# Patient Record
Sex: FEMALE | Race: WHITE | NOT HISPANIC OR LATINO
[De-identification: names, ages, dates, MRNs, and addresses within clinical notes are randomized per-mention and may not be internally consistent; named-entity substitution may affect disease eponyms.]

---

## 2020-10-15 ENCOUNTER — ASOB RESULT (OUTPATIENT)
Age: 35
End: 2020-10-15

## 2020-10-15 ENCOUNTER — TRANSCRIPTION ENCOUNTER (OUTPATIENT)
Age: 35
End: 2020-10-15

## 2020-10-15 ENCOUNTER — APPOINTMENT (OUTPATIENT)
Dept: MATERNAL FETAL MEDICINE | Facility: CLINIC | Age: 35
End: 2020-10-15

## 2020-10-15 RX ORDER — ISOPROPYL ALCOHOL 0.7 ML/ML
SWAB TOPICAL
Qty: 2 | Refills: 2 | Status: ACTIVE | COMMUNITY
Start: 2020-10-15 | End: 1900-01-01

## 2020-10-15 RX ORDER — LANCETS 33 GAUGE
EACH MISCELLANEOUS
Qty: 2 | Refills: 2 | Status: ACTIVE | COMMUNITY
Start: 2020-10-15 | End: 1900-01-01

## 2020-10-15 RX ORDER — BLOOD-GLUCOSE METER
KIT MISCELLANEOUS 4 TIMES DAILY
Qty: 2 | Refills: 2 | Status: ACTIVE | COMMUNITY
Start: 2020-10-15 | End: 1900-01-01

## 2020-10-15 RX ORDER — BLOOD-GLUCOSE METER
W/DEVICE KIT MISCELLANEOUS
Qty: 1 | Refills: 0 | Status: ACTIVE | COMMUNITY
Start: 2020-10-15 | End: 1900-01-01

## 2020-10-19 ENCOUNTER — ASOB RESULT (OUTPATIENT)
Age: 35
End: 2020-10-19

## 2020-10-19 ENCOUNTER — APPOINTMENT (OUTPATIENT)
Dept: ANTEPARTUM | Facility: CLINIC | Age: 35
End: 2020-10-19
Payer: COMMERCIAL

## 2020-10-19 PROCEDURE — 76811 OB US DETAILED SNGL FETUS: CPT

## 2020-10-23 ENCOUNTER — APPOINTMENT (OUTPATIENT)
Dept: MATERNAL FETAL MEDICINE | Facility: CLINIC | Age: 35
End: 2020-10-23
Payer: COMMERCIAL

## 2020-10-23 ENCOUNTER — ASOB RESULT (OUTPATIENT)
Age: 35
End: 2020-10-23

## 2020-10-23 PROCEDURE — G0108 DIAB MANAGE TRN  PER INDIV: CPT | Mod: 95

## 2020-10-23 RX ORDER — PEN NEEDLE, DIABETIC 29 G X1/2"
32G X 4 MM NEEDLE, DISPOSABLE MISCELLANEOUS
Qty: 1 | Refills: 1 | Status: ACTIVE | COMMUNITY
Start: 2020-10-23 | End: 1900-01-01

## 2020-10-23 RX ORDER — ISOPROPYL ALCOHOL 0.7 ML/ML
SWAB TOPICAL
Qty: 1 | Refills: 1 | Status: ACTIVE | COMMUNITY
Start: 2020-10-23 | End: 1900-01-01

## 2020-10-28 ENCOUNTER — OUTPATIENT (OUTPATIENT)
Dept: OUTPATIENT SERVICES | Age: 35
LOS: 1 days | Discharge: HOME | End: 2020-10-28

## 2020-10-30 ENCOUNTER — APPOINTMENT (OUTPATIENT)
Dept: MATERNAL FETAL MEDICINE | Facility: CLINIC | Age: 35
End: 2020-10-30
Payer: COMMERCIAL

## 2020-10-30 ENCOUNTER — ASOB RESULT (OUTPATIENT)
Age: 35
End: 2020-10-30

## 2020-10-30 PROCEDURE — G0108 DIAB MANAGE TRN  PER INDIV: CPT | Mod: 95

## 2020-11-04 ENCOUNTER — APPOINTMENT (OUTPATIENT)
Dept: PEDIATRIC CARDIOLOGY | Facility: CLINIC | Age: 35
End: 2020-11-04
Payer: COMMERCIAL

## 2020-11-04 PROCEDURE — 76825 ECHO EXAM OF FETAL HEART: CPT

## 2020-11-04 PROCEDURE — 99072 ADDL SUPL MATRL&STAF TM PHE: CPT

## 2020-11-04 PROCEDURE — 76827 ECHO EXAM OF FETAL HEART: CPT

## 2020-11-04 PROCEDURE — 93325 DOPPLER ECHO COLOR FLOW MAPG: CPT

## 2020-11-13 ENCOUNTER — ASOB RESULT (OUTPATIENT)
Age: 35
End: 2020-11-13

## 2020-11-13 ENCOUNTER — APPOINTMENT (OUTPATIENT)
Dept: MATERNAL FETAL MEDICINE | Facility: CLINIC | Age: 35
End: 2020-11-13
Payer: COMMERCIAL

## 2020-11-13 PROCEDURE — G0108 DIAB MANAGE TRN  PER INDIV: CPT | Mod: 95

## 2020-11-13 RX ORDER — INSULIN LISPRO 100 [IU]/ML
100 INJECTION, SOLUTION INTRAVENOUS; SUBCUTANEOUS
Qty: 1 | Refills: 1 | Status: ACTIVE | COMMUNITY
Start: 2020-11-13 | End: 1900-01-01

## 2020-11-23 ENCOUNTER — ASOB RESULT (OUTPATIENT)
Age: 35
End: 2020-11-23

## 2020-11-23 ENCOUNTER — APPOINTMENT (OUTPATIENT)
Dept: ANTEPARTUM | Facility: CLINIC | Age: 35
End: 2020-11-23
Payer: COMMERCIAL

## 2020-11-23 PROCEDURE — 99214 OFFICE O/P EST MOD 30 MIN: CPT | Mod: 95

## 2020-12-04 ENCOUNTER — APPOINTMENT (OUTPATIENT)
Dept: MATERNAL FETAL MEDICINE | Facility: CLINIC | Age: 35
End: 2020-12-04
Payer: COMMERCIAL

## 2020-12-04 ENCOUNTER — ASOB RESULT (OUTPATIENT)
Age: 35
End: 2020-12-04

## 2020-12-04 PROCEDURE — G0108 DIAB MANAGE TRN  PER INDIV: CPT | Mod: 95

## 2020-12-15 ENCOUNTER — APPOINTMENT (OUTPATIENT)
Dept: ANTEPARTUM | Facility: CLINIC | Age: 35
End: 2020-12-15
Payer: COMMERCIAL

## 2020-12-15 ENCOUNTER — ASOB RESULT (OUTPATIENT)
Age: 35
End: 2020-12-15

## 2020-12-15 PROCEDURE — 76816 OB US FOLLOW-UP PER FETUS: CPT

## 2020-12-15 PROCEDURE — 99072 ADDL SUPL MATRL&STAF TM PHE: CPT

## 2020-12-15 PROCEDURE — 76819 FETAL BIOPHYS PROFIL W/O NST: CPT

## 2020-12-24 ENCOUNTER — ASOB RESULT (OUTPATIENT)
Age: 35
End: 2020-12-24

## 2020-12-24 ENCOUNTER — APPOINTMENT (OUTPATIENT)
Dept: MATERNAL FETAL MEDICINE | Facility: CLINIC | Age: 35
End: 2020-12-24
Payer: COMMERCIAL

## 2020-12-24 PROCEDURE — G0108 DIAB MANAGE TRN  PER INDIV: CPT | Mod: 95

## 2021-01-12 ENCOUNTER — ASOB RESULT (OUTPATIENT)
Age: 36
End: 2021-01-12

## 2021-01-12 ENCOUNTER — APPOINTMENT (OUTPATIENT)
Dept: ANTEPARTUM | Facility: CLINIC | Age: 36
End: 2021-01-12
Payer: COMMERCIAL

## 2021-01-12 ENCOUNTER — OUTPATIENT (OUTPATIENT)
Dept: OUTPATIENT SERVICES | Facility: HOSPITAL | Age: 36
LOS: 1 days | End: 2021-01-12

## 2021-01-12 ENCOUNTER — APPOINTMENT (OUTPATIENT)
Dept: ANTEPARTUM | Facility: HOSPITAL | Age: 36
End: 2021-01-12

## 2021-01-12 PROCEDURE — 76818 FETAL BIOPHYS PROFILE W/NST: CPT | Mod: 26

## 2021-01-12 PROCEDURE — 76816 OB US FOLLOW-UP PER FETUS: CPT

## 2021-01-12 PROCEDURE — 99072 ADDL SUPL MATRL&STAF TM PHE: CPT

## 2021-01-15 ENCOUNTER — APPOINTMENT (OUTPATIENT)
Dept: MATERNAL FETAL MEDICINE | Facility: CLINIC | Age: 36
End: 2021-01-15
Payer: COMMERCIAL

## 2021-01-15 ENCOUNTER — ASOB RESULT (OUTPATIENT)
Age: 36
End: 2021-01-15

## 2021-01-15 PROCEDURE — G0108 DIAB MANAGE TRN  PER INDIV: CPT | Mod: 95

## 2021-01-26 ENCOUNTER — OUTPATIENT (OUTPATIENT)
Dept: OUTPATIENT SERVICES | Facility: HOSPITAL | Age: 36
LOS: 1 days | End: 2021-01-26

## 2021-01-26 ENCOUNTER — ASOB RESULT (OUTPATIENT)
Age: 36
End: 2021-01-26

## 2021-01-26 ENCOUNTER — APPOINTMENT (OUTPATIENT)
Dept: ANTEPARTUM | Facility: HOSPITAL | Age: 36
End: 2021-01-26

## 2021-01-26 ENCOUNTER — APPOINTMENT (OUTPATIENT)
Dept: ANTEPARTUM | Facility: CLINIC | Age: 36
End: 2021-01-26
Payer: COMMERCIAL

## 2021-01-26 PROCEDURE — 76818 FETAL BIOPHYS PROFILE W/NST: CPT | Mod: 26

## 2021-02-02 ENCOUNTER — APPOINTMENT (OUTPATIENT)
Dept: ANTEPARTUM | Facility: HOSPITAL | Age: 36
End: 2021-02-02

## 2021-02-02 ENCOUNTER — APPOINTMENT (OUTPATIENT)
Dept: ANTEPARTUM | Facility: CLINIC | Age: 36
End: 2021-02-02

## 2021-02-05 ENCOUNTER — APPOINTMENT (OUTPATIENT)
Dept: MATERNAL FETAL MEDICINE | Facility: CLINIC | Age: 36
End: 2021-02-05
Payer: COMMERCIAL

## 2021-02-05 ENCOUNTER — ASOB RESULT (OUTPATIENT)
Age: 36
End: 2021-02-05

## 2021-02-05 DIAGNOSIS — O09.523 SUPERVISION OF ELDERLY MULTIGRAVIDA, THIRD TRIMESTER: ICD-10-CM

## 2021-02-05 DIAGNOSIS — O34.13 MATERNAL CARE FOR BENIGN TUMOR OF CORPUS UTERI, THIRD TRIMESTER: ICD-10-CM

## 2021-02-05 DIAGNOSIS — O24.414 GESTATIONAL DIABETES MELLITUS IN PREGNANCY, INSULIN CONTROLLED: ICD-10-CM

## 2021-02-05 PROCEDURE — G0108 DIAB MANAGE TRN  PER INDIV: CPT | Mod: 95

## 2021-02-05 RX ORDER — INSULIN HUMAN 100 [IU]/ML
100 INJECTION, SUSPENSION SUBCUTANEOUS
Qty: 1 | Refills: 2 | Status: ACTIVE | COMMUNITY
Start: 2020-10-23 | End: 1900-01-01

## 2021-02-09 ENCOUNTER — APPOINTMENT (OUTPATIENT)
Dept: ANTEPARTUM | Facility: CLINIC | Age: 36
End: 2021-02-09

## 2021-02-09 ENCOUNTER — APPOINTMENT (OUTPATIENT)
Dept: ANTEPARTUM | Facility: CLINIC | Age: 36
End: 2021-02-09
Payer: COMMERCIAL

## 2021-02-09 ENCOUNTER — APPOINTMENT (OUTPATIENT)
Dept: ANTEPARTUM | Facility: HOSPITAL | Age: 36
End: 2021-02-09

## 2021-02-09 ENCOUNTER — ASOB RESULT (OUTPATIENT)
Age: 36
End: 2021-02-09

## 2021-02-09 ENCOUNTER — OUTPATIENT (OUTPATIENT)
Dept: OUTPATIENT SERVICES | Facility: HOSPITAL | Age: 36
LOS: 1 days | End: 2021-02-09

## 2021-02-09 PROCEDURE — 99072 ADDL SUPL MATRL&STAF TM PHE: CPT

## 2021-02-09 PROCEDURE — 76816 OB US FOLLOW-UP PER FETUS: CPT

## 2021-02-09 PROCEDURE — 76818 FETAL BIOPHYS PROFILE W/NST: CPT | Mod: 26

## 2021-02-13 DIAGNOSIS — O24.414 GESTATIONAL DIABETES MELLITUS IN PREGNANCY, INSULIN CONTROLLED: ICD-10-CM

## 2021-02-13 DIAGNOSIS — O34.13 MATERNAL CARE FOR BENIGN TUMOR OF CORPUS UTERI, THIRD TRIMESTER: ICD-10-CM

## 2021-02-13 DIAGNOSIS — O09.523 SUPERVISION OF ELDERLY MULTIGRAVIDA, THIRD TRIMESTER: ICD-10-CM

## 2021-02-16 ENCOUNTER — ASOB RESULT (OUTPATIENT)
Age: 36
End: 2021-02-16

## 2021-02-16 ENCOUNTER — APPOINTMENT (OUTPATIENT)
Dept: ANTEPARTUM | Facility: CLINIC | Age: 36
End: 2021-02-16
Payer: COMMERCIAL

## 2021-02-16 ENCOUNTER — OUTPATIENT (OUTPATIENT)
Dept: OUTPATIENT SERVICES | Facility: HOSPITAL | Age: 36
LOS: 1 days | End: 2021-02-16

## 2021-02-16 ENCOUNTER — OUTPATIENT (OUTPATIENT)
Dept: INPATIENT UNIT | Facility: HOSPITAL | Age: 36
LOS: 1 days | Discharge: HOME | End: 2021-02-16
Payer: COMMERCIAL

## 2021-02-16 VITALS
SYSTOLIC BLOOD PRESSURE: 125 MMHG | TEMPERATURE: 99 F | DIASTOLIC BLOOD PRESSURE: 58 MMHG | RESPIRATION RATE: 18 BRPM | HEART RATE: 62 BPM

## 2021-02-16 VITALS — SYSTOLIC BLOOD PRESSURE: 116 MMHG | DIASTOLIC BLOOD PRESSURE: 64 MMHG | HEART RATE: 82 BPM

## 2021-02-16 DIAGNOSIS — Z98.890 OTHER SPECIFIED POSTPROCEDURAL STATES: Chronic | ICD-10-CM

## 2021-02-16 DIAGNOSIS — Z3A.00 WEEKS OF GESTATION OF PREGNANCY NOT SPECIFIED: ICD-10-CM

## 2021-02-16 DIAGNOSIS — Z98.891 HISTORY OF UTERINE SCAR FROM PREVIOUS SURGERY: Chronic | ICD-10-CM

## 2021-02-16 DIAGNOSIS — O26.899 OTHER SPECIFIED PREGNANCY RELATED CONDITIONS, UNSPECIFIED TRIMESTER: ICD-10-CM

## 2021-02-16 PROCEDURE — 76818 FETAL BIOPHYS PROFILE W/NST: CPT | Mod: 26

## 2021-02-16 PROCEDURE — 99214 OFFICE O/P EST MOD 30 MIN: CPT

## 2021-02-16 NOTE — OB PROVIDER TRIAGE NOTE - NSHPLABSRESULTS_GEN_ALL_CORE
Vital Signs Last 24 Hrs  T(C): 37.1 (16 Feb 2021 15:36), Max: 37.1 (16 Feb 2021 15:36)  T(F): 98.8 (16 Feb 2021 15:36), Max: 98.8 (16 Feb 2021 15:36)  HR: 82 (16 Feb 2021 16:58) (62 - 82)  BP: 116/64 (16 Feb 2021 16:58) (95/52 - 125/58)  BP(mean): --  RR: 18 (16 Feb 2021 15:36) (18 - 18)  SpO2: --

## 2021-02-16 NOTE — OB PROVIDER TRIAGE NOTE - PMH
Bell's palsy  2013  Diabetes mellitus  Novulin 38units qpm  History of  delivery  2013. GDMA2  Uterine polyp

## 2021-02-16 NOTE — OB PROVIDER TRIAGE NOTE - NSHPPHYSICALEXAM_GEN_ALL_CORE
Gen: NAD; A+O x 3  Cardiac: S1/S2, R/R/R  Pulm: CTAB, unlabored breathing  Abdomen: Gravid, soft, non-tender  VS-BP: 125/58, P 62, RR 18, T 37.1, Pain 0/10   Cat 1 tracing x 1 hour: 130 bpm, moderate variability, +accels, no decels (Reviewed by Dr Montenegro)  Harrells: Acontractile

## 2021-02-16 NOTE — OB PROVIDER TRIAGE NOTE - ADDITIONAL INSTRUCTIONS
Follow up with Dr Montenegro for routine OB care  Continue fetal kick counts/fetal awareness  Return to triage for: decreased fetal movement, leakage of fluid, vaginal bleeding, increase in contraction frequency/intensity, or for any other concerns.

## 2021-02-16 NOTE — OB PROVIDER TRIAGE NOTE - NSOBPROVIDERNOTE_OBGYN_ALL_OB_FT
Dr Montenegro notified of above findings  Patient cleared to be discharge home by Dr Montenegro  Pt to f/u in the office and ATU as scheduled

## 2021-02-16 NOTE — OB PROVIDER TRIAGE NOTE - PLAN OF CARE
Patient cleared to be discharged home by Dr Montenegro  Patient to follow-up with Dr Montenegro   labor warning signs and daily FKC reviewed. Pt verbalized understanding

## 2021-02-16 NOTE — OB PROVIDER TRIAGE NOTE - NS_OBGYNHISTORY_OBGYN_ALL_OB_FT
Current pregnancy c/b GDMA2  2013-pLTCS @ 39 wks. IOL x2 for GDMA2. Failure to progress. Female, 7#10 ([place] Palsy)   2020-Complete SAB @ 6 wks.  s/p D&C hysteroscopic polypectomy  Pt denies any h/o: Abn. PAP, ovarian cysts, uterine fibroids, breast problems, STDs/STIs

## 2021-02-16 NOTE — OB PROVIDER TRIAGE NOTE - HISTORY OF PRESENT ILLNESS
Patient of Dr Montenegro  35 y/o  female, para 1011 @ 39+0 wks gestation, single IUP.  Referred from ATU for prolonged monitoring due to prolonged decel on NST.   Pt endorses strong fetal movement, denies any leakage of fluid, vaginal bleeding, contractions or cramps    A/P Complications: GDMA2 on Insulin (Fasting FS usually in the 70s-80s and Post-prandial in the 120s). Fetal Echo 20-wnl.  Blood Transfusion: Patient will accept blood    Covid Assessment: Pt denies any: fever, chills, cough, SOB or any recent known exposure to Covid-19.    Allergies: NKDA  Meds: PNV, Humulin 38 units qhs; Humalog 14 units before meals PRN, ASA    PMH: Bell's Palsy  PSH: C/S ('13); D&C Hysteroscopic polypectomy ('12)  OBgynHx    2013-pLTCS @ 39 wks. IOL x2 for GDMA2. Failure to progress. Female, 7#10 ([place] Palsy)   2020-Complete SAB @ 6 wks.  s/p D&C hysteroscopic polypectomy  Pt denies any h/o: Abn. PAP, ovarian cysts, uterine fibroids, breast problems, STDs/STIs  PSY: Denies  EtOH/ Smoke/ Recreational substance use: denies  FH: Diabetes, HTN (Mother)  H/W/BMI: 61"/180#/34    MFM Sonogram today: Cephalic, Anterior placenta, BPP 8/8, AMADEO 17.51cm, EFW 2550g (24%) on 21.          Patient of Dr Montenegro  35 y/o  female, para 1011 @ 39+0 wks gestation, single IUP.  Referred from ATU for prolonged monitoring due to prolonged decel on NST.   Pt endorses strong fetal movement, denies any leakage of fluid, vaginal bleeding, contractions or cramps.  Patient with h/o prior c/s in  for failure to progress, scheduled for repeat c/s on 3/4/21  NPO: 11: 15 am    A/P Complications: GDMA2 on Insulin (Fasting FS usually in the 70s-80s and Post-prandial in the 120s). Fetal Echo 20-wnl.  Blood Transfusion: Patient will accept blood    Covid Assessment: Pt denies any: fever, chills, cough, SOB or any recent known exposure to Covid-19.    Allergies: NKDA  Meds: PNV, Humulin 38 units qhs; Humalog 14 units before meals PRN, ASA    PMH: Bell's Palsy  PSH: C/S ('13); D&C Hysteroscopic polypectomy ('12)  OBgynHx    2013-pLTCS @ 39 wks. IOL x2 for GDMA2. Failure to progress. Female, 7#10 ([place] Palsy)   2020-Complete SAB @ 6 wks.  s/p D&C hysteroscopic polypectomy  Pt denies any h/o: Abn. PAP, ovarian cysts, uterine fibroids, breast problems, STDs/STIs  PSY: Denies  EtOH/ Smoke/ Recreational substance use: denies  FH: Diabetes, HTN (Mother)  H/W/BMI: 61"/180#/34    MFM Sonogram today: Cephalic, Anterior placenta, BPP 8/8, AMADEO 17.51cm, EFW 2550g (24%) on 21.

## 2021-02-19 ENCOUNTER — APPOINTMENT (OUTPATIENT)
Dept: MATERNAL FETAL MEDICINE | Facility: CLINIC | Age: 36
End: 2021-02-19
Payer: COMMERCIAL

## 2021-02-19 ENCOUNTER — ASOB RESULT (OUTPATIENT)
Age: 36
End: 2021-02-19

## 2021-02-19 PROBLEM — N84.0 POLYP OF CORPUS UTERI: Chronic | Status: ACTIVE | Noted: 2021-02-16

## 2021-02-19 PROBLEM — Z98.891 HISTORY OF UTERINE SCAR FROM PREVIOUS SURGERY: Chronic | Status: ACTIVE | Noted: 2021-02-16

## 2021-02-19 PROCEDURE — G0108 DIAB MANAGE TRN  PER INDIV: CPT | Mod: 95

## 2021-02-22 ENCOUNTER — OUTPATIENT (OUTPATIENT)
Dept: OUTPATIENT SERVICES | Facility: HOSPITAL | Age: 36
LOS: 1 days | End: 2021-02-22

## 2021-02-22 VITALS
RESPIRATION RATE: 14 BRPM | OXYGEN SATURATION: 98 % | DIASTOLIC BLOOD PRESSURE: 60 MMHG | HEIGHT: 60 IN | HEART RATE: 73 BPM | SYSTOLIC BLOOD PRESSURE: 110 MMHG | WEIGHT: 173.94 LBS | TEMPERATURE: 98 F

## 2021-02-22 DIAGNOSIS — O24.414 GESTATIONAL DIABETES MELLITUS IN PREGNANCY, INSULIN CONTROLLED: ICD-10-CM

## 2021-02-22 DIAGNOSIS — O34.13 MATERNAL CARE FOR BENIGN TUMOR OF CORPUS UTERI, THIRD TRIMESTER: ICD-10-CM

## 2021-02-22 DIAGNOSIS — O09.523 SUPERVISION OF ELDERLY MULTIGRAVIDA, THIRD TRIMESTER: ICD-10-CM

## 2021-02-22 DIAGNOSIS — O34.29 MATERNAL CARE DUE TO UTERINE SCAR FROM OTHER PREVIOUS SURGERY: ICD-10-CM

## 2021-02-22 DIAGNOSIS — Z98.891 HISTORY OF UTERINE SCAR FROM PREVIOUS SURGERY: Chronic | ICD-10-CM

## 2021-02-22 DIAGNOSIS — Z98.890 OTHER SPECIFIED POSTPROCEDURAL STATES: Chronic | ICD-10-CM

## 2021-02-22 LAB
ANION GAP SERPL CALC-SCNC: 12 MMOL/L — SIGNIFICANT CHANGE UP (ref 7–14)
APPEARANCE UR: CLEAR — SIGNIFICANT CHANGE UP
BACTERIA # UR AUTO: ABNORMAL
BILIRUB UR-MCNC: NEGATIVE — SIGNIFICANT CHANGE UP
BLD GP AB SCN SERPL QL: NEGATIVE — SIGNIFICANT CHANGE UP
BUN SERPL-MCNC: 12 MG/DL — SIGNIFICANT CHANGE UP (ref 7–23)
CALCIUM SERPL-MCNC: 9.1 MG/DL — SIGNIFICANT CHANGE UP (ref 8.4–10.5)
CHLORIDE SERPL-SCNC: 99 MMOL/L — SIGNIFICANT CHANGE UP (ref 98–107)
CO2 SERPL-SCNC: 24 MMOL/L — SIGNIFICANT CHANGE UP (ref 22–31)
COLOR SPEC: YELLOW — SIGNIFICANT CHANGE UP
CREAT SERPL-MCNC: 0.94 MG/DL — SIGNIFICANT CHANGE UP (ref 0.5–1.3)
DIFF PNL FLD: NEGATIVE — SIGNIFICANT CHANGE UP
EPI CELLS # UR: 16 /HPF — HIGH (ref 0–5)
GLUCOSE SERPL-MCNC: 64 MG/DL — LOW (ref 70–99)
GLUCOSE UR QL: NEGATIVE — SIGNIFICANT CHANGE UP
HCT VFR BLD CALC: 36.2 % — SIGNIFICANT CHANGE UP (ref 34.5–45)
HGB BLD-MCNC: 12.2 G/DL — SIGNIFICANT CHANGE UP (ref 11.5–15.5)
HYALINE CASTS # UR AUTO: 1 /LPF — SIGNIFICANT CHANGE UP (ref 0–7)
KETONES UR-MCNC: NEGATIVE — SIGNIFICANT CHANGE UP
LEUKOCYTE ESTERASE UR-ACNC: NEGATIVE — SIGNIFICANT CHANGE UP
MCHC RBC-ENTMCNC: 31.1 PG — SIGNIFICANT CHANGE UP (ref 27–34)
MCHC RBC-ENTMCNC: 33.7 GM/DL — SIGNIFICANT CHANGE UP (ref 32–36)
MCV RBC AUTO: 92.3 FL — SIGNIFICANT CHANGE UP (ref 80–100)
NITRITE UR-MCNC: NEGATIVE — SIGNIFICANT CHANGE UP
NRBC # BLD: 0 /100 WBCS — SIGNIFICANT CHANGE UP
NRBC # FLD: 0 K/UL — SIGNIFICANT CHANGE UP
PH UR: 7.5 — SIGNIFICANT CHANGE UP (ref 5–8)
PLATELET # BLD AUTO: 279 K/UL — SIGNIFICANT CHANGE UP (ref 150–400)
POTASSIUM SERPL-MCNC: 3.7 MMOL/L — SIGNIFICANT CHANGE UP (ref 3.5–5.3)
POTASSIUM SERPL-SCNC: 3.7 MMOL/L — SIGNIFICANT CHANGE UP (ref 3.5–5.3)
PROT UR-MCNC: ABNORMAL
RBC # BLD: 3.92 M/UL — SIGNIFICANT CHANGE UP (ref 3.8–5.2)
RBC # FLD: 12.4 % — SIGNIFICANT CHANGE UP (ref 10.3–14.5)
RBC CASTS # UR COMP ASSIST: 2 /HPF — SIGNIFICANT CHANGE UP (ref 0–4)
RH IG SCN BLD-IMP: POSITIVE — SIGNIFICANT CHANGE UP
SODIUM SERPL-SCNC: 135 MMOL/L — SIGNIFICANT CHANGE UP (ref 135–145)
SP GR SPEC: 1.02 — SIGNIFICANT CHANGE UP (ref 1.01–1.02)
UROBILINOGEN FLD QL: SIGNIFICANT CHANGE UP
WBC # BLD: 12.15 K/UL — HIGH (ref 3.8–10.5)
WBC # FLD AUTO: 12.15 K/UL — HIGH (ref 3.8–10.5)
WBC UR QL: 2 /HPF — SIGNIFICANT CHANGE UP (ref 0–5)

## 2021-02-22 RX ORDER — INSULIN NPH HUM/REG INSULIN HM 70-30/ML
0 VIAL (ML) SUBCUTANEOUS
Qty: 0 | Refills: 0 | DISCHARGE

## 2021-02-22 NOTE — OB PST NOTE - NSHPREVIEWOFSYSTEMS_GEN_ALL_CORE
General: No fever, chills, sweating, anorexia, weight loss or weight gain. No polyphagia, polyurea, polydypsia, malaise, or fatigue    Skin: No rashes, itching, or dryness. No change in size/color of moles. No tumors, brittle nails, pitted nails, or hair loss    Breast: No tenderness, lumps, or nipple discharge      Ophthalmologic: No diplopia, photophobia, lacrimation, blurred Vision , or eye discharge    ENMT Symptoms: + nasal congestion as per OB - normal No hearing difficulty, ear pain, tinnitus, or vertigo. No sinus symptoms, nasal discharge, or nasal obstruction    Respiratory and Thorax: No wheezing, dyspnea, cough, hemoptysis, or pleuritic chest pain     Cardiovascular: + peripheral edema- ankles No chest pain, palpitations, dyspnea on exertion, orthopnea, paroxysmal nocturnal dyspnea, or claudication    Gastrointestinal: No nausea, vomiting, diarrhea, constipation, change in bowel habits, flatulence, abdominal pain, or melena    Genitourinary/ Pelvis: No hematuria, renal colic, or flank pain.  No urine discoloration, incontinence, dysuria, or urinary hesitancy. Normal urinary frequency. No nocturia, abnormal vaginal bleeding, vaginal discharge, spotting, pelvic pain, or vaginal leakage    Musculoskeletal: No arthralgia, arthritis, joint swelling, muscle cramping, muscle weakness, neck pain, arm pain, or leg pain    Neurological: Hx bells palsy- resolved with occasional left eye drooping and left mouth twitching when tired No transient paralysis, weakness, paresthesias, or seizures. No syncope, tremors, vertigo, loss of sensation, difficulty walking, loss of consciousness, hemiparesis, confusion,    Psychiatric: No suicidal ideation, depression, anxiety, insomnia, memory loss, paranoia, mood swings, agitation, hallucinations, or hyperactivity, denies hx of postpartum depression     Hematology: No gum bleeding, nose bleeding, or skin lumps    Lymphatic: No enlarged or tender lymph nodes. No extremity swelling    Endocrine: GDM preprandial 85-95 mg/dl and postprandial 131-138 mg/dl  No heat or cold intolerance    Immunologic: No recurrent or persistent infections

## 2021-02-22 NOTE — OB PST NOTE - NSHPPHYSICALEXAM_GEN_ALL_CORE
Constitutional: Well Developed, Well Groomed, Well Nourished, No Distress    Eyes: PERRL, EOMI, conjunctiva clear    Ears: Normal    Mouth & Gums: Normal, moist    Pharynx: No tenderness, discharge, or peritonsillar abscess    Tonsils: No Redness, discharge, tenderness, or swelling    Neck: Supple, no JVD, normal thyroid glands, no carotid bruits, no cervical vertebral or paraspinal tenderness    Breast: Patient declines    Back: Normal shape, ROM intact, strength intact, no vertebral tenderness    Respiratory: Airway patent, breath sounds equal, good air movement, respiration non-labored, clear to auscultation bilateral, no chest wall tenderness, no intercostal retractions, no rales, no wheezes, no rhonchi, no subcutaneous emphysema    Cardiovascular:  Regular rate and rhythm, no rubs or murmur, normal PMI    Gastrointestinal: Gravid abdomen bowel sound normal    Extremities: No clubbing, cyanosis, or pedal edema    Vascular:   ankle swelling, Radial Pulse normal,  DP pulse normal, PT pulse normal    Neurological: alert & oriented x 3, sensation intact, cranial nerve intact, normal strength    Skin: warm and dry, normal color    Lymph Nodes: normal posterior cervical lymph node, normal anterior cervical lymph node, normal supraclavicular lymph node,     Musculoskeletal: ROM intact, no joint swelling, warmth, or calf tenderness. Normal strength    Psychiatric: normal affect, normal behavior

## 2021-02-22 NOTE — OB PST NOTE - PROBLEM SELECTOR PLAN 1
Assessment and Plan: Patient scheduled for surgery on 3/4/2021  Patient provided with verbal and written presurgical instructions; verbalized understanding  with teach back.    Patient provided with Chlorhexidine wash, verbal and written instructions reviewed. Patient demonstrated understanding with teach back.   Patient confirmed COVID appointment   Patient instructed to stop PNV today     Patient to obtain ASA instruction from OB     Problem: Diabetes  Assessment and Plan: Patient instructed to contact OB/ diabetic educator on medications adjustments: take half dose of Novolog night before   OR booking notified of DM  POCT glucose testing upon admission Assessment and Plan: Patient scheduled for surgery on 3/4/2021  Patient provided with verbal and written presurgical instructions; verbalized understanding  with teach back.    Patient provided with Chlorhexidine wash, verbal and written instructions reviewed. Patient demonstrated understanding with teach back.   Patient confirmed COVID appointment   Patient instructed to stop PNV today     Patient to obtain ASA instruction from OB     Problem: Diabetes  Assessment and Plan: Patient instructed to contact OB/ diabetic educator on medications adjustments: "take half dose of Novolog night before ""  OR booking notified of DM  POCT glucose testing upon admission

## 2021-02-22 NOTE — OB PST NOTE - HISTORY OF PRESENT ILLNESS
36 year old pregnant female reports normal fetal movement. Last movement noted 15 minutes ago. G 3 P 1  Denies pelvic pain, sustained back pain, vaginal bleeding, or leakage of amniotic fluid.  Patient presents for PST evaluation for planned  section   36 year old pregnant female reports normal fetal movement. Last movement noted 5 minutes ago. G 3 P 1  Denies pelvic pain, sustained back pain, vaginal bleeding, or leakage of amniotic fluid.  Patient presents for PST evaluation for planned  section

## 2021-02-23 ENCOUNTER — APPOINTMENT (OUTPATIENT)
Dept: ANTEPARTUM | Facility: CLINIC | Age: 36
End: 2021-02-23
Payer: COMMERCIAL

## 2021-02-23 ENCOUNTER — ASOB RESULT (OUTPATIENT)
Age: 36
End: 2021-02-23

## 2021-02-23 ENCOUNTER — OUTPATIENT (OUTPATIENT)
Dept: OUTPATIENT SERVICES | Facility: HOSPITAL | Age: 36
LOS: 1 days | End: 2021-02-23

## 2021-02-23 ENCOUNTER — APPOINTMENT (OUTPATIENT)
Dept: ANTEPARTUM | Facility: HOSPITAL | Age: 36
End: 2021-02-23

## 2021-02-23 DIAGNOSIS — Z98.890 OTHER SPECIFIED POSTPROCEDURAL STATES: Chronic | ICD-10-CM

## 2021-02-23 DIAGNOSIS — Z98.891 HISTORY OF UTERINE SCAR FROM PREVIOUS SURGERY: Chronic | ICD-10-CM

## 2021-02-23 PROCEDURE — 76818 FETAL BIOPHYS PROFILE W/NST: CPT | Mod: 26

## 2021-02-27 DIAGNOSIS — Z01.818 ENCOUNTER FOR OTHER PREPROCEDURAL EXAMINATION: ICD-10-CM

## 2021-03-01 ENCOUNTER — APPOINTMENT (OUTPATIENT)
Dept: DISASTER EMERGENCY | Facility: CLINIC | Age: 36
End: 2021-03-01

## 2021-03-01 DIAGNOSIS — O09.523 SUPERVISION OF ELDERLY MULTIGRAVIDA, THIRD TRIMESTER: ICD-10-CM

## 2021-03-01 DIAGNOSIS — O24.414 GESTATIONAL DIABETES MELLITUS IN PREGNANCY, INSULIN CONTROLLED: ICD-10-CM

## 2021-03-01 DIAGNOSIS — O99.213 OBESITY COMPLICATING PREGNANCY, THIRD TRIMESTER: ICD-10-CM

## 2021-03-02 ENCOUNTER — ASOB RESULT (OUTPATIENT)
Age: 36
End: 2021-03-02

## 2021-03-02 ENCOUNTER — APPOINTMENT (OUTPATIENT)
Dept: ANTEPARTUM | Facility: CLINIC | Age: 36
End: 2021-03-02

## 2021-03-02 ENCOUNTER — APPOINTMENT (OUTPATIENT)
Dept: ANTEPARTUM | Facility: CLINIC | Age: 36
End: 2021-03-02
Payer: COMMERCIAL

## 2021-03-02 ENCOUNTER — OUTPATIENT (OUTPATIENT)
Dept: OUTPATIENT SERVICES | Facility: HOSPITAL | Age: 36
LOS: 1 days | End: 2021-03-02

## 2021-03-02 DIAGNOSIS — Z98.890 OTHER SPECIFIED POSTPROCEDURAL STATES: Chronic | ICD-10-CM

## 2021-03-02 DIAGNOSIS — Z98.891 HISTORY OF UTERINE SCAR FROM PREVIOUS SURGERY: Chronic | ICD-10-CM

## 2021-03-02 PROBLEM — O09.529 SUPERVISION OF ELDERLY MULTIGRAVIDA, UNSPECIFIED TRIMESTER: Chronic | Status: ACTIVE | Noted: 2021-02-22

## 2021-03-02 PROBLEM — O24.419 GESTATIONAL DIABETES MELLITUS IN PREGNANCY, UNSPECIFIED CONTROL: Chronic | Status: ACTIVE | Noted: 2021-02-22

## 2021-03-02 PROBLEM — G51.0 BELL'S PALSY: Chronic | Status: ACTIVE | Noted: 2021-02-16

## 2021-03-02 LAB — SARS-COV-2 N GENE NPH QL NAA+PROBE: NOT DETECTED

## 2021-03-02 PROCEDURE — 76816 OB US FOLLOW-UP PER FETUS: CPT

## 2021-03-02 PROCEDURE — 76818 FETAL BIOPHYS PROFILE W/NST: CPT | Mod: 26

## 2021-03-02 PROCEDURE — 99072 ADDL SUPL MATRL&STAF TM PHE: CPT

## 2021-03-03 ENCOUNTER — TRANSCRIPTION ENCOUNTER (OUTPATIENT)
Age: 36
End: 2021-03-03

## 2021-03-04 ENCOUNTER — TRANSCRIPTION ENCOUNTER (OUTPATIENT)
Age: 36
End: 2021-03-04

## 2021-03-04 ENCOUNTER — INPATIENT (INPATIENT)
Facility: HOSPITAL | Age: 36
LOS: 1 days | Discharge: HOME | End: 2021-03-06
Attending: OBSTETRICS & GYNECOLOGY | Admitting: OBSTETRICS & GYNECOLOGY
Payer: COMMERCIAL

## 2021-03-04 VITALS — TEMPERATURE: 98 F

## 2021-03-04 DIAGNOSIS — O09.523 SUPERVISION OF ELDERLY MULTIGRAVIDA, THIRD TRIMESTER: ICD-10-CM

## 2021-03-04 DIAGNOSIS — Z98.891 HISTORY OF UTERINE SCAR FROM PREVIOUS SURGERY: Chronic | ICD-10-CM

## 2021-03-04 DIAGNOSIS — Z98.890 OTHER SPECIFIED POSTPROCEDURAL STATES: Chronic | ICD-10-CM

## 2021-03-04 DIAGNOSIS — O34.13 MATERNAL CARE FOR BENIGN TUMOR OF CORPUS UTERI, THIRD TRIMESTER: ICD-10-CM

## 2021-03-04 DIAGNOSIS — O34.29 MATERNAL CARE DUE TO UTERINE SCAR FROM OTHER PREVIOUS SURGERY: ICD-10-CM

## 2021-03-04 DIAGNOSIS — Z98.891 HISTORY OF UTERINE SCAR FROM PREVIOUS SURGERY: ICD-10-CM

## 2021-03-04 DIAGNOSIS — O24.414 GESTATIONAL DIABETES MELLITUS IN PREGNANCY, INSULIN CONTROLLED: ICD-10-CM

## 2021-03-04 LAB
BLD GP AB SCN SERPL QL: NEGATIVE — SIGNIFICANT CHANGE UP
GLUCOSE BLDC GLUCOMTR-MCNC: 105 MG/DL — HIGH (ref 70–99)
GLUCOSE BLDC GLUCOMTR-MCNC: 124 MG/DL — HIGH (ref 70–99)
HCT VFR BLD CALC: 35.7 % — SIGNIFICANT CHANGE UP (ref 34.5–45)
HGB BLD-MCNC: 12.3 G/DL — SIGNIFICANT CHANGE UP (ref 11.5–15.5)
MCHC RBC-ENTMCNC: 31.3 PG — SIGNIFICANT CHANGE UP (ref 27–34)
MCHC RBC-ENTMCNC: 34.5 GM/DL — SIGNIFICANT CHANGE UP (ref 32–36)
MCV RBC AUTO: 90.8 FL — SIGNIFICANT CHANGE UP (ref 80–100)
NRBC # BLD: 0 /100 WBCS — SIGNIFICANT CHANGE UP
NRBC # FLD: 0 K/UL — SIGNIFICANT CHANGE UP
PLATELET # BLD AUTO: 240 K/UL — SIGNIFICANT CHANGE UP (ref 150–400)
RBC # BLD: 3.93 M/UL — SIGNIFICANT CHANGE UP (ref 3.8–5.2)
RBC # FLD: 12.3 % — SIGNIFICANT CHANGE UP (ref 10.3–14.5)
RH IG SCN BLD-IMP: POSITIVE — SIGNIFICANT CHANGE UP
SARS-COV-2 IGG SERPL QL IA: NEGATIVE — SIGNIFICANT CHANGE UP
SARS-COV-2 IGM SERPL IA-ACNC: 0.08 INDEX — SIGNIFICANT CHANGE UP
T PALLIDUM AB TITR SER: NEGATIVE — SIGNIFICANT CHANGE UP
WBC # BLD: 9.47 K/UL — SIGNIFICANT CHANGE UP (ref 3.8–10.5)
WBC # FLD AUTO: 9.47 K/UL — SIGNIFICANT CHANGE UP (ref 3.8–10.5)

## 2021-03-04 PROCEDURE — 59025 FETAL NON-STRESS TEST: CPT | Mod: 26,U9

## 2021-03-04 PROCEDURE — 59514 CESAREAN DELIVERY ONLY: CPT | Mod: AS,U9

## 2021-03-04 RX ORDER — OXYCODONE HYDROCHLORIDE 5 MG/1
10 TABLET ORAL
Refills: 0 | Status: DISCONTINUED | OUTPATIENT
Start: 2021-03-04 | End: 2021-03-05

## 2021-03-04 RX ORDER — SENNA PLUS 8.6 MG/1
1 TABLET ORAL
Refills: 0 | Status: DISCONTINUED | OUTPATIENT
Start: 2021-03-04 | End: 2021-03-06

## 2021-03-04 RX ORDER — SODIUM CHLORIDE 9 MG/ML
1000 INJECTION, SOLUTION INTRAVENOUS
Refills: 0 | Status: DISCONTINUED | OUTPATIENT
Start: 2021-03-04 | End: 2021-03-04

## 2021-03-04 RX ORDER — NALOXONE HYDROCHLORIDE 4 MG/.1ML
0.1 SPRAY NASAL
Refills: 0 | Status: DISCONTINUED | OUTPATIENT
Start: 2021-03-04 | End: 2021-03-05

## 2021-03-04 RX ORDER — CITRIC ACID/SODIUM CITRATE 300-500 MG
30 SOLUTION, ORAL ORAL ONCE
Refills: 0 | Status: COMPLETED | OUTPATIENT
Start: 2021-03-04 | End: 2021-03-04

## 2021-03-04 RX ORDER — DEXAMETHASONE 0.5 MG/5ML
4 ELIXIR ORAL EVERY 6 HOURS
Refills: 0 | Status: DISCONTINUED | OUTPATIENT
Start: 2021-03-04 | End: 2021-03-05

## 2021-03-04 RX ORDER — SODIUM CHLORIDE 9 MG/ML
1000 INJECTION, SOLUTION INTRAVENOUS ONCE
Refills: 0 | Status: COMPLETED | OUTPATIENT
Start: 2021-03-04 | End: 2021-03-04

## 2021-03-04 RX ORDER — OXYTOCIN 10 UNIT/ML
333.33 VIAL (ML) INJECTION
Qty: 20 | Refills: 0 | Status: DISCONTINUED | OUTPATIENT
Start: 2021-03-04 | End: 2021-03-04

## 2021-03-04 RX ORDER — ONDANSETRON 8 MG/1
4 TABLET, FILM COATED ORAL EVERY 6 HOURS
Refills: 0 | Status: DISCONTINUED | OUTPATIENT
Start: 2021-03-04 | End: 2021-03-06

## 2021-03-04 RX ORDER — KETOROLAC TROMETHAMINE 30 MG/ML
30 SYRINGE (ML) INJECTION EVERY 6 HOURS
Refills: 0 | Status: DISCONTINUED | OUTPATIENT
Start: 2021-03-04 | End: 2021-03-05

## 2021-03-04 RX ORDER — ACETAMINOPHEN 500 MG
975 TABLET ORAL
Refills: 0 | Status: DISCONTINUED | OUTPATIENT
Start: 2021-03-04 | End: 2021-03-06

## 2021-03-04 RX ORDER — FAMOTIDINE 10 MG/ML
20 INJECTION INTRAVENOUS ONCE
Refills: 0 | Status: COMPLETED | OUTPATIENT
Start: 2021-03-04 | End: 2021-03-04

## 2021-03-04 RX ORDER — OXYCODONE HYDROCHLORIDE 5 MG/1
5 TABLET ORAL
Refills: 0 | Status: DISCONTINUED | OUTPATIENT
Start: 2021-03-04 | End: 2021-03-06

## 2021-03-04 RX ORDER — DIPHENHYDRAMINE HCL 50 MG
25 CAPSULE ORAL EVERY 6 HOURS
Refills: 0 | Status: DISCONTINUED | OUTPATIENT
Start: 2021-03-04 | End: 2021-03-06

## 2021-03-04 RX ORDER — SIMETHICONE 80 MG/1
80 TABLET, CHEWABLE ORAL EVERY 4 HOURS
Refills: 0 | Status: DISCONTINUED | OUTPATIENT
Start: 2021-03-04 | End: 2021-03-06

## 2021-03-04 RX ORDER — NALBUPHINE HYDROCHLORIDE 10 MG/ML
2.5 INJECTION, SOLUTION INTRAMUSCULAR; INTRAVENOUS; SUBCUTANEOUS EVERY 6 HOURS
Refills: 0 | Status: DISCONTINUED | OUTPATIENT
Start: 2021-03-04 | End: 2021-03-05

## 2021-03-04 RX ORDER — OXYCODONE HYDROCHLORIDE 5 MG/1
5 TABLET ORAL ONCE
Refills: 0 | Status: DISCONTINUED | OUTPATIENT
Start: 2021-03-04 | End: 2021-03-06

## 2021-03-04 RX ORDER — OXYCODONE HYDROCHLORIDE 5 MG/1
5 TABLET ORAL
Refills: 0 | Status: DISCONTINUED | OUTPATIENT
Start: 2021-03-04 | End: 2021-03-05

## 2021-03-04 RX ORDER — METOCLOPRAMIDE HCL 10 MG
10 TABLET ORAL ONCE
Refills: 0 | Status: COMPLETED | OUTPATIENT
Start: 2021-03-04 | End: 2021-03-04

## 2021-03-04 RX ORDER — FERROUS SULFATE 325(65) MG
325 TABLET ORAL DAILY
Refills: 0 | Status: DISCONTINUED | OUTPATIENT
Start: 2021-03-04 | End: 2021-03-05

## 2021-03-04 RX ORDER — MAGNESIUM HYDROXIDE 400 MG/1
30 TABLET, CHEWABLE ORAL
Refills: 0 | Status: DISCONTINUED | OUTPATIENT
Start: 2021-03-04 | End: 2021-03-06

## 2021-03-04 RX ORDER — HEPARIN SODIUM 5000 [USP'U]/ML
5000 INJECTION INTRAVENOUS; SUBCUTANEOUS EVERY 12 HOURS
Refills: 0 | Status: DISCONTINUED | OUTPATIENT
Start: 2021-03-04 | End: 2021-03-06

## 2021-03-04 RX ORDER — IBUPROFEN 200 MG
600 TABLET ORAL EVERY 6 HOURS
Refills: 0 | Status: COMPLETED | OUTPATIENT
Start: 2021-03-04 | End: 2022-01-31

## 2021-03-04 RX ORDER — LANOLIN
1 OINTMENT (GRAM) TOPICAL EVERY 6 HOURS
Refills: 0 | Status: DISCONTINUED | OUTPATIENT
Start: 2021-03-04 | End: 2021-03-06

## 2021-03-04 RX ORDER — ASPIRIN/CALCIUM CARB/MAGNESIUM 324 MG
0 TABLET ORAL
Qty: 0 | Refills: 0 | DISCHARGE

## 2021-03-04 RX ORDER — TETANUS TOXOID, REDUCED DIPHTHERIA TOXOID AND ACELLULAR PERTUSSIS VACCINE, ADSORBED 5; 2.5; 8; 8; 2.5 [IU]/.5ML; [IU]/.5ML; UG/.5ML; UG/.5ML; UG/.5ML
0.5 SUSPENSION INTRAMUSCULAR ONCE
Refills: 0 | Status: COMPLETED | OUTPATIENT
Start: 2021-03-04

## 2021-03-04 RX ADMIN — Medication 30 MILLIGRAM(S): at 20:38

## 2021-03-04 RX ADMIN — Medication 30 MILLIGRAM(S): at 20:53

## 2021-03-04 RX ADMIN — Medication 30 MILLILITER(S): at 06:58

## 2021-03-04 RX ADMIN — Medication 975 MILLIGRAM(S): at 23:45

## 2021-03-04 RX ADMIN — Medication 10 MILLIGRAM(S): at 06:58

## 2021-03-04 RX ADMIN — FAMOTIDINE 20 MILLIGRAM(S): 10 INJECTION INTRAVENOUS at 06:58

## 2021-03-04 RX ADMIN — HEPARIN SODIUM 5000 UNIT(S): 5000 INJECTION INTRAVENOUS; SUBCUTANEOUS at 15:29

## 2021-03-04 RX ADMIN — SODIUM CHLORIDE 2000 MILLILITER(S): 9 INJECTION, SOLUTION INTRAVENOUS at 07:00

## 2021-03-04 NOTE — DISCHARGE NOTE OB - CARE PLAN
Principal Discharge DX:	 delivery delivered  Goal:	GOOD RECOVERY  Assessment and plan of treatment:	F/U 03/15

## 2021-03-04 NOTE — PACU DISCHARGE NOTE - NS MD DISCHARGE NOTE DISCHARGE
SUBJECTIVE:   Emily Moreira is a 28 year old female who presents to clinic today for the following health issues:      ENT Symptoms             Symptoms: cc Present Absent Comment   Fever/Chills  x  Feels hot then cold   Fatigue  x     Muscle Aches  x     Eye Irritation   x    Sneezing   x    Nasal Samuel/Drg  x     Sinus Pressure/Pain  x  Burning in left nostril when she blows her nose   Loss of smell   x    Dental pain  x  Upper canine teeth   Sore Throat  x  Thinks from coughing   Swollen Glands   x    Ear Pain/Fullness   x    Cough x x  Dry cough   Wheeze  x     Chest Pain  x     Shortness of breath  x     Rash   x    Other  x  Headache, vomiting, stress incontinence     Symptom duration:  2 days not severe until today   Symptom severity:  severe   Treatments tried:  AlkaSeltzer, Robitussin, inhaler, nebulizer   Contacts:  work       Yesterday started with dry cough. This AM really started to feel ill. Unsure how high temp has been at home, has not checked. Vomiting due to coughing so hard. Taking over the counter and that really is not helping.       Problem list and histories reviewed & adjusted, as indicated.  Additional history: as documented    Current Outpatient Prescriptions   Medication Sig Dispense Refill     guaiFENesin-codeine (ROBITUSSIN AC) 100-10 MG/5ML SOLN solution Take 5-10 mLs by mouth every 4 hours as needed for cough 180 mL 1     oseltamivir (TAMIFLU) 75 MG capsule Take 1 capsule (75 mg) by mouth 2 times daily 10 capsule 0     oxyCODONE-acetaminophen (PERCOCET) 5-325 MG per tablet Take 1-2 tablets by mouth every 4 hours as needed for pain maximum 4 tablet(s) per day 30 tablet 0     meloxicam (MOBIC) 15 MG tablet Take 1 tablet (15 mg) by mouth daily 30 tablet 1     triamcinolone (KENALOG) 0.1 % cream Apply sparingly to affected area two times daily for 10- 14 days. 30 g 1     hydrOXYzine (ATARAX) 25 MG tablet Take 1-2 tablets (25-50 mg) by mouth every 6 hours as needed for anxiety 45  Floor tablet 1     ranitidine (ZANTAC) 300 MG tablet Take 1 tablet (300 mg) by mouth At Bedtime 30 tablet 1     metoclopramide (REGLAN) 10 MG tablet Take 1 tablet (10 mg) by mouth 4 times daily as needed 60 tablet 1     albuterol (PROAIR HFA/PROVENTIL HFA/VENTOLIN HFA) 108 (90 BASE) MCG/ACT Inhaler Inhale 2 puffs into the lungs every 6 hours as needed for shortness of breath / dyspnea or wheezing 1 Inhaler 1     norgestimate-ethinyl estradiol (ORTHO-CYCLEN, SPRINTEC) 0.25-35 MG-MCG per tablet Take 1 tablet by mouth daily 84 tablet 3     cyclobenzaprine (FLEXERIL) 10 MG tablet Take 1 tablet (10 mg) by mouth nightly as needed for muscle spasms 14 tablet 1     traMADol (ULTRAM) 50 MG tablet Take 1-2 tablets ( mg) by mouth every 6 hours as needed for pain maximum 8 tablet(s) per day (Patient not taking: Reported on 1/19/2018) 20 tablet 0     cyclobenzaprine (FLEXERIL) 10 MG tablet Take 1 tablet (10 mg) by mouth 3 times daily as needed for muscle spasms 21 tablet 0     escitalopram (LEXAPRO) 10 MG tablet Take 1 tablet (10 mg) by mouth daily (Patient not taking: Reported on 12/22/2017) 30 tablet 0     omeprazole (PRILOSEC) 20 MG CR capsule Take 1 capsule (20 mg) by mouth daily as needed Needs to be seen by provider for further refills (Patient not taking: Reported on 1/19/2018) 30 capsule 0     sucralfate (CARAFATE) 1 GM tablet Take 1 tablet (1 g) by mouth 4 times daily (Patient not taking: Reported on 5/12/2017) 42 tablet 1     SUMAtriptan (IMITREX) 25 MG tablet Take 1-2 tablets (25-50 mg) by mouth at onset of headache for migraine May repeat dose in 2 hours.  Do not exceed 200 mg in 24 hours (Patient not taking: Reported on 5/12/2017) 9 tablet 3     Allergies   Allergen Reactions     Vicodin [Hydrocodone-Acetaminophen] GI Disturbance     Vomiting even with anti-nausea meds.       Reviewed and updated as needed this visit by clinical staffTobacco  Allergies  Meds  Med Hx  Surg Hx  Fam Hx  Soc Hx      Reviewed and  "updated as needed this visit by Provider         ROS:  Review of Systems   Constitutional: Positive for chills, fatigue and fever. Negative for diaphoresis.   HENT: Positive for congestion, drooling, sinus pressure and sore throat (from coughing). Negative for ear discharge, ear pain, hearing loss and rhinorrhea.    Eyes: Negative for discharge and itching.   Respiratory: Positive for cough (dry), chest tightness, shortness of breath and wheezing.    Gastrointestinal: Positive for vomiting. Negative for constipation, diarrhea and nausea.   Genitourinary: Positive for enuresis (stress incont).   Musculoskeletal: Positive for myalgias.   Skin: Negative for rash.   Neurological: Positive for headaches. Negative for dizziness and light-headedness.         OBJECTIVE:     /70 (BP Location: Right arm, Patient Position: Sitting, Cuff Size: Adult Regular)  Pulse 121  Temp 99.6  F (37.6  C) (Tympanic)  Ht 4' 10.75\" (1.492 m)  Wt 164 lb 9.6 oz (74.7 kg)  LMP 12/08/2017 (Approximate)  SpO2 99%  BMI 33.53 kg/m2  Body mass index is 33.53 kg/(m^2).  Physical Exam   Constitutional: She appears well-developed.   HENT:   Right Ear: Tympanic membrane and external ear normal.   Left Ear: Tympanic membrane and external ear normal.   Nose: No mucosal edema or rhinorrhea.   Cardiovascular: Normal rate, regular rhythm and normal heart sounds.    Pulmonary/Chest: Effort normal and breath sounds normal.   Abdominal: Soft. Bowel sounds are normal.   Neurological: She is alert.   Skin: Skin is warm and dry.   Psychiatric: She has a normal mood and affect.       ASSESSMENT/PLAN:   1. Throat pain  - Rapid strep screen  - Beta strep group A culture    2. Cough  - Influenza A/B antigen    3. Influenza A  Off work until 24 hours fever free  Rest  Push fluids  Tylenol or ibuprofen as needed  Educated regarding length of illness, normally 7-10 days  Follow up if start feeling any worse.   - guaiFENesin-codeine (ROBITUSSIN AC) 100-10 " MG/5ML SOLN solution; Take 5-10 mLs by mouth every 4 hours as needed for cough  Dispense: 180 mL; Refill: 1  - oseltamivir (TAMIFLU) 75 MG capsule; Take 1 capsule (75 mg) by mouth 2 times daily  Dispense: 10 capsule; Refill: 0        AngelaAMIRA [facility]

## 2021-03-04 NOTE — DISCHARGE NOTE OB - MATERIALS PROVIDED
Immunization Record/Bottle Feeding Log/Guide to Postpartum Care/Shaken Baby Prevention Handout/Birth Certificate Instructions

## 2021-03-04 NOTE — OB PROVIDER H&P - NSHPREVIEWOFSYSTEMS_GEN_ALL_CORE
A&Ox3  GENERAL: denies fever, malaise, body aches, COVID status negative  CARDIOVASCULAR:  denies murmurs or h/o cardio myopathy, denies palpitations, chest pain or LOC  RESPIRATORY: denies cough, wheeze or SOB  HEMATOLOGICAL: denies blood clotting disorder, h/o PE or DVT, h/o Blood Transfusion

## 2021-03-04 NOTE — OB PROVIDER H&P - HISTORY OF PRESENT ILLNESS
35yo female   EDC3/10/21 presents@ 39.1 wks for scheduled  rltcs.  +AP course GDMA2 on insulin, otherwise unremarkable. Denies SOB,fever, chills or cough.  Denies exposure to COVID-19, negative COVID-19 test(3/1/2021)  Denies VB,ROM or UCs today.  +FM

## 2021-03-04 NOTE — BRIEF OPERATIVE NOTE - OPERATION/FINDINGS
Viable male infant, apgar 9/9, Wgt 6.1#, 3260gms  delivered @09:17  cephalic presentation, clear copious fluid   loose nuchal x 1  hysterotomy closed in single layer   Intercede over hysterotomy  otherwise grossly nl uterus, tubes & ovaries    QBL: 272  EBL: 600  UOP: 80  IVF:   Dictation Number: [id number]

## 2021-03-04 NOTE — DISCHARGE NOTE OB - PATIENT PORTAL LINK FT
You can access the FollowMyHealth Patient Portal offered by [facility] by registering at the following website: [email or web address]. By joining [employer]’s FollowMyHealth portal, you will also be able to view your health information using other applications (apps) compatible with our system.

## 2021-03-04 NOTE — OB PROVIDER DELIVERY SUMMARY - NSPROVIDERDELIVERYNOTE_OBGYN_ALL_OB_FT
Viable male infant, apgar 9/9, Wgt 6.1#, 3260gms  delivered @09:17  cephalic presentation, clear copious fluid   loose nuchal x 1  hysterotomy closed in single layer   Intercede over hysterotomy  otherwise grossly nl uterus, tubes & ovaries    QBL: 272  EBL: 600  UOP: 80  IVF:   Dictation Number: [id number]

## 2021-03-04 NOTE — BRIEF OPERATIVE NOTE - NSICDXBRIEFPROCEDURE_GEN_ALL_CORE_FT
PROCEDURES:  Lysis, adhesions, peritoneum 04-Mar-2021 10:53:43  Kenyatta Jerome  Repeat  section 04-Mar-2021 10:53:32  Kenyatta Jerome

## 2021-03-04 NOTE — DISCHARGE NOTE OB - MEDICATION SUMMARY - MEDICATIONS TO STOP TAKING
I will STOP taking the medications listed below when I get home from the hospital:    aspirin 81 mg oral tablet, chewable    NovoLIN L 100 units/mL subcutaneous suspension  -- 38 unit(s) subcutaneous once a day (at bedtime)    NovoLOG FlexTouch 100 units/mL subcutaneous solution  -- 14 unit(s) subcutaneous , As Needed for high carb meals

## 2021-03-04 NOTE — DISCHARGE NOTE OB - CARE PROVIDER_API CALL
Ronaldo Montenegro)  Obstetrics and Gynecology  [address] [place] [address]  Phone: [phone]  Fax: [phone]  Follow Up Time:

## 2021-03-04 NOTE — BRIEF OPERATIVE NOTE - NSICDXBRIEFPREOP_GEN_ALL_CORE_FT
PRE-OP DIAGNOSIS:  Gestational diabetes mellitus, class A2 04-Mar-2021 10:54:40  Kenyatta Jerome  H/O:  section 04-Mar-2021 10:53:57  Kenyatta Jerome

## 2021-03-04 NOTE — OB RN PATIENT PROFILE - PMH
AMA (advanced maternal age) multigravida 35+    Bell's palsy  , left eye dropping and left mouth twiching when tired  Gestational diabetes mellitus    History of  delivery  . GDMA2  Uterine polyp

## 2021-03-04 NOTE — OB RN DELIVERY SUMMARY - NS_SEPSISRSKCALC_OBGYN_ALL_OB_FT
EOS calculated successfully. EOS Risk Factor: 0.5/1000 live births ([facility] national incidence); GA=39w1d; Temp=97.9; ROM=0.017; GBS='Negative'; Antibiotics='No antibiotics or any antibiotics < 2 hrs prior to birth'

## 2021-03-04 NOTE — OB PROVIDER H&P - NSHPPHYSICALEXAM_GEN_ALL_CORE
T(C): 36.6 (03-04-21 @ 06:46), Max: 36.6 (03-04-21 @ 06:40)  HR: 73 (03-04-21 @ 06:46) (73 - 73)  BP: 104/63 (03-04-21 @ 06:46) (104/63 - 104/63)  RR: 15 (03-04-21 @ 06:46) (15 - 15)  SpO2: --Height (cm): 154.9 (03-04-21 @ 06:46)  Weight (kg): 81.6 (03-04-21 @ 06:46)  BMI (kg/m2): 34 (03-04-21 @ 06:46)  BSA (m2): 1.81 (03-04-21 @ 06:46)    A&Ox3  Heart: RRR, S1&S2, no S3  Lungs: Clear bilateral to auscultation, good inspiratory /expiratory effort              no rhonchi, no rales  Abd: soft, Gravid, TOCO in place           +pfannenstial scar  EFM: 130 bpm/moderate variabilty/+accelerations/no decelerations/CAT 1  TOCO:  no UC  Ext:  FROM /bilateral  1+ LE edema   Neuro: grossly intact

## 2021-03-04 NOTE — OB PROVIDER H&P - ASSESSMENT
Admit to [facility] Montenegro MD-Service  Dx: scheduled rLTCS  NPO  routine labs  FS on admission  EFM/TOCO  Bedside TLTAS  anesthesia consult  Kenyatta Brunner PAC, C-EFM  03-04-21 @ 06:59

## 2021-03-05 LAB
BASOPHILS # BLD AUTO: 0.04 K/UL — SIGNIFICANT CHANGE UP (ref 0–0.2)
BASOPHILS NFR BLD AUTO: 0.3 % — SIGNIFICANT CHANGE UP (ref 0–2)
EOSINOPHIL # BLD AUTO: 0.11 K/UL — SIGNIFICANT CHANGE UP (ref 0–0.5)
EOSINOPHIL NFR BLD AUTO: 0.9 % — SIGNIFICANT CHANGE UP (ref 0–6)
GLUCOSE BLDC GLUCOMTR-MCNC: 92 MG/DL — SIGNIFICANT CHANGE UP (ref 70–99)
HCT VFR BLD CALC: 29 % — LOW (ref 34.5–45)
HGB BLD-MCNC: 9.9 G/DL — LOW (ref 11.5–15.5)
IANC: 8.14 K/UL — SIGNIFICANT CHANGE UP (ref 1.5–8.5)
IMM GRANULOCYTES NFR BLD AUTO: 0.6 % — SIGNIFICANT CHANGE UP (ref 0–1.5)
LYMPHOCYTES # BLD AUTO: 25.3 % — SIGNIFICANT CHANGE UP (ref 13–44)
LYMPHOCYTES # BLD AUTO: 3.14 K/UL — SIGNIFICANT CHANGE UP (ref 1–3.3)
MCHC RBC-ENTMCNC: 31.1 PG — SIGNIFICANT CHANGE UP (ref 27–34)
MCHC RBC-ENTMCNC: 34.1 GM/DL — SIGNIFICANT CHANGE UP (ref 32–36)
MCV RBC AUTO: 91.2 FL — SIGNIFICANT CHANGE UP (ref 80–100)
MONOCYTES # BLD AUTO: 0.91 K/UL — HIGH (ref 0–0.9)
MONOCYTES NFR BLD AUTO: 7.3 % — SIGNIFICANT CHANGE UP (ref 2–14)
NEUTROPHILS # BLD AUTO: 8.14 K/UL — HIGH (ref 1.8–7.4)
NEUTROPHILS NFR BLD AUTO: 65.6 % — SIGNIFICANT CHANGE UP (ref 43–77)
NRBC # BLD: 0 /100 WBCS — SIGNIFICANT CHANGE UP
NRBC # FLD: 0 K/UL — SIGNIFICANT CHANGE UP
PLATELET # BLD AUTO: 215 K/UL — SIGNIFICANT CHANGE UP (ref 150–400)
RBC # BLD: 3.18 M/UL — LOW (ref 3.8–5.2)
RBC # FLD: 12.2 % — SIGNIFICANT CHANGE UP (ref 10.3–14.5)
WBC # BLD: 12.41 K/UL — HIGH (ref 3.8–10.5)
WBC # FLD AUTO: 12.41 K/UL — HIGH (ref 3.8–10.5)

## 2021-03-05 RX ORDER — FERROUS SULFATE 325(65) MG
325 TABLET ORAL THREE TIMES A DAY
Refills: 0 | Status: DISCONTINUED | OUTPATIENT
Start: 2021-03-05 | End: 2021-03-06

## 2021-03-05 RX ORDER — ASCORBIC ACID 60 MG
500 TABLET,CHEWABLE ORAL DAILY
Refills: 0 | Status: DISCONTINUED | OUTPATIENT
Start: 2021-03-05 | End: 2021-03-06

## 2021-03-05 RX ORDER — IBUPROFEN 200 MG
600 TABLET ORAL EVERY 6 HOURS
Refills: 0 | Status: DISCONTINUED | OUTPATIENT
Start: 2021-03-05 | End: 2021-03-06

## 2021-03-05 RX ADMIN — Medication 975 MILLIGRAM(S): at 12:40

## 2021-03-05 RX ADMIN — Medication 325 MILLIGRAM(S): at 11:42

## 2021-03-05 RX ADMIN — HEPARIN SODIUM 5000 UNIT(S): 5000 INJECTION INTRAVENOUS; SUBCUTANEOUS at 15:55

## 2021-03-05 RX ADMIN — Medication 30 MILLIGRAM(S): at 04:27

## 2021-03-05 RX ADMIN — Medication 975 MILLIGRAM(S): at 06:15

## 2021-03-05 RX ADMIN — Medication 975 MILLIGRAM(S): at 18:40

## 2021-03-05 RX ADMIN — Medication 600 MILLIGRAM(S): at 10:20

## 2021-03-05 RX ADMIN — Medication 600 MILLIGRAM(S): at 22:55

## 2021-03-05 RX ADMIN — Medication 975 MILLIGRAM(S): at 00:15

## 2021-03-05 RX ADMIN — Medication 30 MILLIGRAM(S): at 04:12

## 2021-03-05 RX ADMIN — Medication 600 MILLIGRAM(S): at 23:55

## 2021-03-05 RX ADMIN — Medication 500 MILLIGRAM(S): at 11:42

## 2021-03-05 RX ADMIN — HEPARIN SODIUM 5000 UNIT(S): 5000 INJECTION INTRAVENOUS; SUBCUTANEOUS at 04:13

## 2021-03-05 RX ADMIN — Medication 600 MILLIGRAM(S): at 15:55

## 2021-03-05 RX ADMIN — Medication 975 MILLIGRAM(S): at 05:45

## 2021-03-05 RX ADMIN — Medication 975 MILLIGRAM(S): at 11:42

## 2021-03-05 RX ADMIN — Medication 975 MILLIGRAM(S): at 17:47

## 2021-03-05 RX ADMIN — Medication 600 MILLIGRAM(S): at 16:50

## 2021-03-05 RX ADMIN — Medication 600 MILLIGRAM(S): at 11:20

## 2021-03-05 RX ADMIN — SENNA PLUS 1 TABLET(S): 8.6 TABLET ORAL at 15:55

## 2021-03-05 RX ADMIN — Medication 1 TABLET(S): at 11:42

## 2021-03-05 NOTE — PROGRESS NOTE ADULT - ASSESSMENT
Pt is a 37yo  POD1 from [facility] doing well postpartum.    - check CBC  - continue with PO analgesia  - increase ambulation  - continue regular diet  - encourage incentive spirometry  - d/c IV fluids  - incision dressing removed    Divina Lyons, PGY1

## 2021-03-05 NOTE — PROGRESS NOTE ADULT - SUBJECTIVE AND OBJECTIVE BOX
Patient seen and examined at bedside, no acute overnight events. No acute complaints, pain well controlled. Patient is ambulating and tolerating regular diet. Has not yet passed flatus. Denies CP, SOB, N/V, HA, blurred vision, epigastric pain.    Vital Signs Last 24 Hours  T(C): 36.8 (03-05-21 @ 00:50), Max: 36.9 (03-04-21 @ 17:02)  HR: 64 (03-05-21 @ 00:50) (63 - 80)  BP: 96/60 (03-05-21 @ 00:50) (90/51 - 119/61)  RR: 17 (03-05-21 @ 00:50) (14 - 23)  SpO2: 99% (03-05-21 @ 00:50) (95% - 99%)    I&O's Summary    04 Mar 2021 07:01  -  05 Mar 2021 05:09  --------------------------------------------------------  IN: 2800 mL / OUT: 1650 mL / NET: 1150 mL        Physical Exam:  General: NAD  Abdomen: Soft, expected tenderness, non-distended, fundus firm  Incision: Pfannenstiel incision CDI, staples in place  Pelvic: Lochia wnl    Labs:    Blood Type: B Positive  Antibody Screen: Negative  RPR: Negative               12.3   9.47  )-----------( 240      ( 03-04 @ 06:27 )             35.7                12.2   12.15 )-----------( 279      ( 02-22 @ 21:43 )             36.2         MEDICATIONS  (STANDING):  acetaminophen   Tablet .. 975 milliGRAM(s) Oral <User Schedule>  diphtheria/tetanus/pertussis (acellular) Vaccine (ADAcel) 0.5 milliLiter(s) IntraMuscular once  ferrous    sulfate 325 milliGRAM(s) Oral daily  heparin   Injectable 5000 Unit(s) SubCutaneous every 12 hours  ibuprofen  Tablet. 600 milliGRAM(s) Oral every 6 hours  ketorolac   Injectable 30 milliGRAM(s) IV Push every 6 hours  prenatal multivitamin 1 Tablet(s) Oral daily    MEDICATIONS  (PRN):  dexAMETHasone  Injectable 4 milliGRAM(s) IV Push every 6 hours PRN Nausea  diphenhydrAMINE 25 milliGRAM(s) Oral every 6 hours PRN Pruritus  lanolin Ointment 1 Application(s) Topical every 6 hours PRN Sore Nipples  magnesium hydroxide Suspension 30 milliLiter(s) Oral two times a day PRN Constipation  nalbuphine Injectable 2.5 milliGRAM(s) IV Push every 6 hours PRN Pruritus  naloxone Injectable 0.1 milliGRAM(s) IV Push every 3 minutes PRN For ANY of the following changes in patient status:  A. Breaths Per Minute LESS THAN 10, B. Oxygen saturation LESS THAN 90%, C. Sedation score of 6 for Stop After: 4 Times  ondansetron Injectable 4 milliGRAM(s) IV Push every 6 hours PRN Nausea  oxyCODONE    IR 5 milliGRAM(s) Oral every 3 hours PRN Mild Pain (1 - 3)  oxyCODONE    IR 10 milliGRAM(s) Oral every 3 hours PRN Moderate Pain (4 - 6)  oxyCODONE    IR 5 milliGRAM(s) Oral every 3 hours PRN Moderate to Severe Pain (4-10)  oxyCODONE    IR 5 milliGRAM(s) Oral once PRN Moderate to Severe Pain (4-10)  senna 1 Tablet(s) Oral two times a day PRN Constipation  simethicone 80 milliGRAM(s) Chew every 4 hours PRN Gas

## 2021-03-05 NOTE — PROGRESS NOTE ADULT - SUBJECTIVE AND OBJECTIVE BOX
Pain Service Follow-up  Postop Day  1    S/P  C- Section    T(C): 36.7 (03-05-21 @ 05:08), Max: 36.9 (03-04-21 @ 17:02)  HR: 59 (03-05-21 @ 05:08) (59 - 80)  BP: 113/63 (03-05-21 @ 05:08) (92/72 - 119/61)  RR: 17 (03-05-21 @ 05:08) (15 - 23)  SpO2: 99% (03-05-21 @ 05:08) (95% - 99%)  Wt(kg): --      THERAPY:  Spinal Morphine     Sedation Score:	  [X] Alert	      [  ] Drowsy       [  ] Arousable	[  ] Asleep         [  ] Unresponsive    Side Effects:	  [X] None	      [  ] Nausea       [  ] Pruritus        [  ] Weakness   [  ] Numbness        ASSESSMENT/ PLAN   [ X ] Discontinue         [  ] Continue    [ X ] Documentation and Verification of current medications       Satisfactory Post Anesthetic Course

## 2021-03-05 NOTE — PROGRESS NOTE ADULT - SUBJECTIVE AND OBJECTIVE BOX
Post Op C/S day 1       Pt without complaints    Vital Signs Last 24 Hrs  T(C): 36.7 (05 Mar 2021 05:08), Max: 36.9 (04 Mar 2021 17:02)  T(F): 98.1 (05 Mar 2021 05:08), Max: 98.4 (04 Mar 2021 17:02)  HR: 59 (05 Mar 2021 05:08) (59 - 80)  BP: 113/63 (05 Mar 2021 05:08) (90/51 - 119/61)  BP(mean): 65 (04 Mar 2021 13:00) (54 - 79)  RR: 17 (05 Mar 2021 05:08) (14 - 23)  SpO2: 99% (05 Mar 2021 05:08) (95% - 99%)  MEDICATIONS  (STANDING):  acetaminophen   Tablet .. 975 milliGRAM(s) Oral <User Schedule>  diphtheria/tetanus/pertussis (acellular) Vaccine (ADAcel) 0.5 milliLiter(s) IntraMuscular once  ferrous    sulfate 325 milliGRAM(s) Oral daily  heparin   Injectable 5000 Unit(s) SubCutaneous every 12 hours  ibuprofen  Tablet. 600 milliGRAM(s) Oral every 6 hours  ketorolac   Injectable 30 milliGRAM(s) IV Push every 6 hours  prenatal multivitamin 1 Tablet(s) Oral daily    MEDICATIONS  (PRN):  dexAMETHasone  Injectable 4 milliGRAM(s) IV Push every 6 hours PRN Nausea  diphenhydrAMINE 25 milliGRAM(s) Oral every 6 hours PRN Pruritus  lanolin Ointment 1 Application(s) Topical every 6 hours PRN Sore Nipples  magnesium hydroxide Suspension 30 milliLiter(s) Oral two times a day PRN Constipation  nalbuphine Injectable 2.5 milliGRAM(s) IV Push every 6 hours PRN Pruritus  naloxone Injectable 0.1 milliGRAM(s) IV Push every 3 minutes PRN For ANY of the following changes in patient status:  A. Breaths Per Minute LESS THAN 10, B. Oxygen saturation LESS THAN 90%, C. Sedation score of 6 for Stop After: 4 Times  ondansetron Injectable 4 milliGRAM(s) IV Push every 6 hours PRN Nausea  oxyCODONE    IR 5 milliGRAM(s) Oral every 3 hours PRN Mild Pain (1 - 3)  oxyCODONE    IR 10 milliGRAM(s) Oral every 3 hours PRN Moderate Pain (4 - 6)  oxyCODONE    IR 5 milliGRAM(s) Oral every 3 hours PRN Moderate to Severe Pain (4-10)  oxyCODONE    IR 5 milliGRAM(s) Oral once PRN Moderate to Severe Pain (4-10)  senna 1 Tablet(s) Oral two times a day PRN Constipation  simethicone 80 milliGRAM(s) Chew every 4 hours PRN Gas        Abdomen soft  fundus firm  Incision clean dry and intact  extremities non tender  lochia wnl                          9.9    12.41 )-----------( 215      ( 05 Mar 2021 06:20 )             29.0     Patient doing well    Routine post operative care

## 2021-03-06 VITALS
TEMPERATURE: 98 F | OXYGEN SATURATION: 100 % | DIASTOLIC BLOOD PRESSURE: 63 MMHG | HEART RATE: 75 BPM | SYSTOLIC BLOOD PRESSURE: 108 MMHG

## 2021-03-06 RX ORDER — INSULIN ASPART 100 [IU]/ML
14 INJECTION, SOLUTION SUBCUTANEOUS
Qty: 0 | Refills: 0 | DISCHARGE

## 2021-03-06 RX ORDER — ACETAMINOPHEN 500 MG
3 TABLET ORAL
Qty: 0 | Refills: 0 | DISCHARGE
Start: 2021-03-06

## 2021-03-06 RX ORDER — INSULIN ZINC HUMAN RECOMBINANT 100/ML
38 VIAL (ML) SUBCUTANEOUS
Qty: 0 | Refills: 0 | DISCHARGE

## 2021-03-06 RX ORDER — TETANUS TOXOID, REDUCED DIPHTHERIA TOXOID AND ACELLULAR PERTUSSIS VACCINE, ADSORBED 5; 2.5; 8; 8; 2.5 [IU]/.5ML; [IU]/.5ML; UG/.5ML; UG/.5ML; UG/.5ML
0.5 SUSPENSION INTRAMUSCULAR ONCE
Refills: 0 | Status: COMPLETED | OUTPATIENT
Start: 2021-03-06 | End: 2021-03-06

## 2021-03-06 RX ORDER — IBUPROFEN 200 MG
1 TABLET ORAL
Qty: 0 | Refills: 0 | DISCHARGE
Start: 2021-03-06

## 2021-03-06 RX ADMIN — Medication 975 MILLIGRAM(S): at 02:55

## 2021-03-06 RX ADMIN — Medication 975 MILLIGRAM(S): at 08:57

## 2021-03-06 RX ADMIN — Medication 325 MILLIGRAM(S): at 05:54

## 2021-03-06 RX ADMIN — Medication 975 MILLIGRAM(S): at 14:27

## 2021-03-06 RX ADMIN — Medication 975 MILLIGRAM(S): at 09:40

## 2021-03-06 RX ADMIN — Medication 975 MILLIGRAM(S): at 03:55

## 2021-03-06 RX ADMIN — Medication 600 MILLIGRAM(S): at 05:54

## 2021-03-06 RX ADMIN — TETANUS TOXOID, REDUCED DIPHTHERIA TOXOID AND ACELLULAR PERTUSSIS VACCINE, ADSORBED 0.5 MILLILITER(S): 5; 2.5; 8; 8; 2.5 SUSPENSION INTRAMUSCULAR at 05:44

## 2021-03-06 RX ADMIN — Medication 600 MILLIGRAM(S): at 12:27

## 2021-03-06 RX ADMIN — HEPARIN SODIUM 5000 UNIT(S): 5000 INJECTION INTRAVENOUS; SUBCUTANEOUS at 05:41

## 2021-03-06 RX ADMIN — SIMETHICONE 80 MILLIGRAM(S): 80 TABLET, CHEWABLE ORAL at 05:41

## 2021-03-06 RX ADMIN — Medication 600 MILLIGRAM(S): at 11:26

## 2021-03-06 RX ADMIN — Medication 600 MILLIGRAM(S): at 06:36

## 2021-03-06 NOTE — PROGRESS NOTE ADULT - SUBJECTIVE AND OBJECTIVE BOX
OB Progress Note: pTLCS, POD#2    S: 37yo now  POD#2 s/p pLTCS. Pain is well controlled. She is tolerating a regular diet and passing flatus. She is voiding spontaneously, and ambulating without difficulty. Denies CP/SOB. Denies lightheadedness/dizziness. Denies N/V.    O:  Vitals:  Vital Signs Last 24 Hrs  T(C): 36.8 (06 Mar 2021 05:22), Max: 36.8 (06 Mar 2021 05:22)  T(F): 98.2 (06 Mar 2021 05:22), Max: 98.2 (06 Mar 2021 05:22)  HR: 67 (06 Mar 2021 05:22) (65 - 80)  BP: 111/67 (06 Mar 2021 05:22) (102/63 - 117/61)  BP(mean): --  RR: 18 (06 Mar 2021 05:22) (16 - 18)  SpO2: 100% (06 Mar 2021 05:22) (99% - 100%)    MEDICATIONS  (STANDING):  acetaminophen   Tablet .. 975 milliGRAM(s) Oral <User Schedule>  ascorbic acid 500 milliGRAM(s) Oral daily  ferrous    sulfate 325 milliGRAM(s) Oral three times a day  heparin   Injectable 5000 Unit(s) SubCutaneous every 12 hours  ibuprofen  Tablet. 600 milliGRAM(s) Oral every 6 hours  prenatal multivitamin 1 Tablet(s) Oral daily      MEDICATIONS  (PRN):  diphenhydrAMINE 25 milliGRAM(s) Oral every 6 hours PRN Pruritus  lanolin Ointment 1 Application(s) Topical every 6 hours PRN Sore Nipples  magnesium hydroxide Suspension 30 milliLiter(s) Oral two times a day PRN Constipation  ondansetron Injectable 4 milliGRAM(s) IV Push every 6 hours PRN Nausea  oxyCODONE    IR 5 milliGRAM(s) Oral every 3 hours PRN Moderate to Severe Pain (4-10)  oxyCODONE    IR 5 milliGRAM(s) Oral once PRN Moderate to Severe Pain (4-10)  senna 1 Tablet(s) Oral two times a day PRN Constipation  simethicone 80 milliGRAM(s) Chew every 4 hours PRN Gas      Labs:  Blood type: B Positive  Rubella IgG: RPR: Negative                          9.9<L>   12.41<H> >-----------< 215    (  @ 06:20 )             29.0<L>                        12.3   9.47 >-----------< 240    (  @ 06:27 )             35.7                  PE:  General: NAD  Abdomen: Soft, appropriately tender, incision c/d/i.  Extremities: No erythema, no pitting edema    A/P: 37yo G  P POD#2 s/p pLTCS.  Patient is stable and doing well post-operatively.    - Continue regular diet.  - Increase ambulation.  - Continue motrin, tylenol, oxycodone PRN for pain control. vs. D/c PCEA today and transition to PO pain medication with motrin, tylenol and oxycodone PRN.     Nora MARROQUIN OB Progress Note: pTLCS, POD#2    S: 35yo now  POD#2 s/p rLTCS QBL 272ml. Pt seen and examine at bedside no acute events overnight. Pain is well controlled. She is tolerating a regular diet and passing flatus. She is voiding spontaneously, and ambulating without difficulty. Denies CP/SOB. Denies lightheadedness/dizziness. Denies N/V.    O:  Vitals:  Vital Signs Last 24 Hrs  T(C): 36.8 (06 Mar 2021 05:22), Max: 36.8 (06 Mar 2021 05:22)  T(F): 98.2 (06 Mar 2021 05:22), Max: 98.2 (06 Mar 2021 05:22)  HR: 67 (06 Mar 2021 05:22) (65 - 80)  BP: 111/67 (06 Mar 2021 05:22) (102/63 - 117/61)  BP(mean): --  RR: 18 (06 Mar 2021 05:22) (16 - 18)  SpO2: 100% (06 Mar 2021 05:22) (99% - 100%)    MEDICATIONS  (STANDING):  acetaminophen   Tablet .. 975 milliGRAM(s) Oral <User Schedule>  ascorbic acid 500 milliGRAM(s) Oral daily  ferrous    sulfate 325 milliGRAM(s) Oral three times a day  heparin   Injectable 5000 Unit(s) SubCutaneous every 12 hours  ibuprofen  Tablet. 600 milliGRAM(s) Oral every 6 hours  prenatal multivitamin 1 Tablet(s) Oral daily      MEDICATIONS  (PRN):  diphenhydrAMINE 25 milliGRAM(s) Oral every 6 hours PRN Pruritus  lanolin Ointment 1 Application(s) Topical every 6 hours PRN Sore Nipples  magnesium hydroxide Suspension 30 milliLiter(s) Oral two times a day PRN Constipation  ondansetron Injectable 4 milliGRAM(s) IV Push every 6 hours PRN Nausea  oxyCODONE    IR 5 milliGRAM(s) Oral every 3 hours PRN Moderate to Severe Pain (4-10)  oxyCODONE    IR 5 milliGRAM(s) Oral once PRN Moderate to Severe Pain (4-10)  senna 1 Tablet(s) Oral two times a day PRN Constipation  simethicone 80 milliGRAM(s) Chew every 4 hours PRN Gas      Labs:  Blood type: B Positive  Rubella IgG: RPR: Negative                          9.9<L>   12.41<H> >-----------< 215    (  @ 06:20 )             29.0<L>                        12.3   9.47 >-----------< 240    (  @ 06:27 )             35.7      PE:  General: NAD  Lungs: CTA b/l  CVS: RRR S1S2  Abdomen: Soft, appropriately tender, incision c/d/i. Steri-strips intact  Lochia: wnl  Extremities: No erythema, trace edema    A/P: 35yo now  POD#2 s/p rLTCS QBL 272ml.  Patient is stable and doing well post-operatively.    - Continue regular diet.  - Increase ambulation.  - Continue motrin, tylenol standing and oxycodone PRN for pain control.   - Discharge Planning today     Macarena Kern NP

## 2021-03-11 DIAGNOSIS — O99.213 OBESITY COMPLICATING PREGNANCY, THIRD TRIMESTER: ICD-10-CM

## 2021-03-11 DIAGNOSIS — O24.414 GESTATIONAL DIABETES MELLITUS IN PREGNANCY, INSULIN CONTROLLED: ICD-10-CM

## 2021-03-11 DIAGNOSIS — O09.523 SUPERVISION OF ELDERLY MULTIGRAVIDA, THIRD TRIMESTER: ICD-10-CM

## 2022-04-11 NOTE — BRIEF OPERATIVE NOTE - NSICDXBRIEFPOSTOP_GEN_ALL_CORE_FT
Attempted to call pt. No answer. Left VM to return call.  POST-OP DIAGNOSIS:  S/P repeat low transverse  04-Mar-2021 10:55:25  Kenyatta Jerome

## 2023-01-06 NOTE — DISCHARGE NOTE OB - LAUNCH MEDICATION RECONCILIATION
[General Appearance - Alert] : alert [General Appearance - In No Acute Distress] : in no acute distress [General Appearance - Well Nourished] : well nourished [General Appearance - Well-Appearing] : healthy appearing [Sclera] : the sclera and conjunctiva were normal [Extraocular Movements] : extraocular movements were intact [Normal Oral Mucosa] : normal oral mucosa [No Oral Pallor] : no oral pallor [Neck Appearance] : the appearance of the neck was normal [Neck Cervical Mass (___cm)] : no neck mass was observed [Thyroid Diffuse Enlargement] : the thyroid was not enlarged [Respiration, Rhythm And Depth] : normal respiratory rhythm and effort [Exaggerated Use Of Accessory Muscles For Inspiration] : no accessory muscle use [Auscultation Breath Sounds / Voice Sounds] : lungs were clear to auscultation bilaterally [Heart Rate And Rhythm] : heart rate was normal and rhythm regular [Heart Sounds] : normal S1 and S2 [Murmurs] : no murmurs [Bowel Sounds] : normal bowel sounds [Abdomen Soft] : soft [Abdomen Tenderness] : non-tender [Abdomen Mass (___ Cm)] : no abdominal mass palpated [Involuntary Movements] : no involuntary movements were seen [FreeTextEntry1] : mildy ataxic [Skin Color & Pigmentation] : normal skin color and pigmentation [Skin Turgor] : normal skin turgor [] : no rash [Skin Lesions] : no skin lesions [Cranial Nerves] : cranial nerves 2-12 were intact [Oriented To Time, Place, And Person] : oriented to person, place, and time [Impaired Insight] : insight and judgment were intact [Affect] : the affect was normal <<-----Click here for Discharge Medication Review
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
## 2024-04-01 ENCOUNTER — NON-APPOINTMENT (OUTPATIENT)
Age: 39
End: 2024-04-01

## 2024-04-01 DIAGNOSIS — M79.673 PAIN IN UNSPECIFIED FOOT: ICD-10-CM

## 2024-04-10 ENCOUNTER — APPOINTMENT (OUTPATIENT)
Dept: PODIATRY | Facility: CLINIC | Age: 39
End: 2024-04-10
Payer: COMMERCIAL

## 2024-04-10 PROCEDURE — 17110 DESTRUCTION B9 LES UP TO 14: CPT

## 2024-04-10 NOTE — REVIEW OF SYSTEMS
[Fever] : no fever [Chills] : no chills [Feeling Poorly] : not feeling poorly [Joint Swelling] : no joint swelling [Joint Stiffness] : no joint stiffness [Limb Pain] : no limb pain [Skin Wound] : no skin wound [Itching] : no itching [de-identified] : left foot warts, painful irregular toe nails

## 2024-04-10 NOTE — HISTORY OF PRESENT ILLNESS
[FreeTextEntry1] : 39 year old female patient presents to office today for follow up care of warts on Left foot. Patient states she has continued to apply the Verrucastat twice a day as instructed. Patient states she took her pulse dose of Lamasil as instructed with no issues. She states her thick, irregular toe nails do cause her pain but less since her last visit.

## 2024-04-10 NOTE — PHYSICAL EXAM
[General Appearance - Alert] : alert [General Appearance - In No Acute Distress] : in no acute distress [General Appearance - Well Nourished] : well nourished [de-identified] : No structural deformities bilateral. ROM WNL bilateral, no tenderness with ROM bilateral.   [FreeTextEntry1] : 2 adjacent verruca lesions, each measuring 0.5 x 0.5 cm, to left plantar submet 3 were palpated and examined. Upon palpation patient felt tenderness upon pinch test rather than direct palpation. The lesion(s) resemble a cauliflour like appearance, with petechiae noted to the more medial lesion, and pink healthy intact skin noted to the more lateral lesion. Mycotic, discolored, brittle toe nails present to toes 1 b/l and toes 5 b/l, with mild pain on palpation noted.

## 2024-04-10 NOTE — ASSESSMENT
[FreeTextEntry1] : Exam. Surgical debridement of the Verruca lesions of left foot, with pin point bleeding noted to the more medial lesion, and pink healthy intact skin noted to the more lateral lesion. AGNO3 of 1-14 lesions ([id number]=[id number]) was applied to the remaining (more medial) lesion, followed by a dry, sterile dressing. Patient was advised that removing a wart with this method requires debriding  the area, applying the AGNO3, and removing the dead skin. Reviewed with the patient that it may take up to 12 weeks to remove a wart. Instructed patient to apply Verrustat to the current wart lesion twice a day, reviewed plavement with patient. Instructed patient to dry feet and toe nails well, change wet or damp socks and shoes to dry ones. Patient demonstarted verbal understanding of all instructions.

## 2024-04-24 ENCOUNTER — APPOINTMENT (OUTPATIENT)
Dept: PODIATRY | Facility: CLINIC | Age: 39
End: 2024-04-24
Payer: COMMERCIAL

## 2024-04-24 DIAGNOSIS — Z82.49 FAMILY HISTORY OF ISCHEMIC HEART DISEASE AND OTHER DISEASES OF THE CIRCULATORY SYSTEM: ICD-10-CM

## 2024-04-24 DIAGNOSIS — F17.200 NICOTINE DEPENDENCE, UNSPECIFIED, UNCOMPLICATED: ICD-10-CM

## 2024-04-24 DIAGNOSIS — Z83.3 FAMILY HISTORY OF DIABETES MELLITUS: ICD-10-CM

## 2024-04-24 PROCEDURE — 99213 OFFICE O/P EST LOW 20 MIN: CPT | Mod: 25

## 2024-04-24 PROCEDURE — 17110 DESTRUCTION B9 LES UP TO 14: CPT

## 2024-04-24 NOTE — REVIEW OF SYSTEMS
[Fever] : no fever [Chills] : no chills [Feeling Poorly] : not feeling poorly [Joint Swelling] : no joint swelling [Joint Stiffness] : no joint stiffness [Limb Pain] : no limb pain [Skin Wound] : no skin wound [Itching] : no itching [de-identified] : left foot warts, painful irregular toe nails

## 2024-04-24 NOTE — HISTORY OF PRESENT ILLNESS
[FreeTextEntry1] : This 39 year old female patient presents to office today for follow up care of warts on left foot and fungal toe nails on both feet. The patient states she has continued to apply the Verrucastat twice a day as instructed, and the wart area has been less painful than before. Patient states she notices improvement in the toe nails since starting the Lamasil.

## 2024-04-24 NOTE — ASSESSMENT
[FreeTextEntry1] : Examination. Surgical debridement of the Verruca lesion of left foot, with pin point bleeding noted. AGNO3 of 1-14 lesions (Dc=[id number]) was applied.Then a dry, sterile dressing was applied. Patient was advised that removing a wart with this method requires debriding  the area, applying the AGNO3, and removing the dead skin. Reviewed with patient that it may take up to 12 weeks to remove a wart. Instructed patient to continue to apply Verrustat to the current wart lesion twice a day. Instructed patient to dry feet and toe nails well, change wet or damp socks and shoes to dry ones. Mycotic toe nails debrided with sterile nippers and electric marquita to patient's tolerance. Rx Lamisil 250 mg x 7 tabs, reviewed instructions for use with patient, to take one pill a day for 7 days. Instructed patient to dry toe nails well, and change damp or wet sock and shoes to dry ones. Patient demonstrated verbal understanding of all instructions.

## 2024-05-08 ENCOUNTER — APPOINTMENT (OUTPATIENT)
Dept: PODIATRY | Facility: CLINIC | Age: 39
End: 2024-05-08
Payer: COMMERCIAL

## 2024-05-08 PROCEDURE — 17110 DESTRUCTION B9 LES UP TO 14: CPT

## 2024-05-08 NOTE — PHYSICAL EXAM
[General Appearance - Alert] : alert [General Appearance - Well Nourished] : well nourished [General Appearance - In No Acute Distress] : in no acute distress [de-identified] : No structural deformities bilateral. ROM WNL bilateral. No tenderness and with ROM bilateral.   [FreeTextEntry1] : 2 verruca lesions, 1st lesion measuring 0.3 x 0.3 cm, to left plantar submet 3, 2nd lesion measuring 0.3 cm x 0.3 cm slightly distal to the 1st lesion- both were palpated and examined. Upon palpation patient felt tenderness upon pinch test rather than direct palpation. The lesion(s) resemble a cauliflower like appearance, with petechiae noted. Mycotic, discolored, brittle toe nails present to toes 1 b/l and toes 5 b/l, with mild pain on palpation noted, with proximal clearing noted.

## 2024-05-08 NOTE — REVIEW OF SYSTEMS
[Fever] : no fever [Chills] : no chills [Feeling Poorly] : not feeling poorly [Joint Swelling] : no joint swelling [Joint Stiffness] : no joint stiffness [Limb Pain] : no limb pain [Skin Wound] : no skin wound [Itching] : no itching [de-identified] : left foot warts, painful irregular toe nails

## 2024-05-08 NOTE — HISTORY OF PRESENT ILLNESS
[FreeTextEntry1] : 39 year old female patient presents for follow up care of warts on left foot. Patient states she has been applying the Verrucastat twice a day as instructed, and the wart area has been less painful than before, however she thinks she has a new wart, near the current wart. Patient states she notices improvement in the toe nails since starting taking Lamasil.

## 2024-05-08 NOTE — ASSESSMENT
[FreeTextEntry1] : Exam. Surgical debridement of the Verruca lesions of left foot, with pin point bleeding noted. AGNO3 of 1-14 lesions (Ww=[id number]) was applied. Then a dry, sterile dressing was applied. Patient was advised that removing a wart with this method requires debriding  the area, applying the AGNO3, and removing the dead skin. Instructed patient that it may take up to 12 weeks to remove a wart. Instructed patient to apply Verrustat to the wart lesions twice a day. Instructed patient to dry feet and toe nails well, change wet or damp socks and shoes to dry ones. Instructed patient to dry toe nails well, and change damp or wet sock and shoes to dry ones. Patient demonstrated verbal understanding of all instructions. Patient to return to office in 2 weeks.

## 2024-05-22 ENCOUNTER — APPOINTMENT (OUTPATIENT)
Dept: PODIATRY | Facility: CLINIC | Age: 39
End: 2024-05-22
Payer: COMMERCIAL

## 2024-05-22 PROCEDURE — 99213 OFFICE O/P EST LOW 20 MIN: CPT | Mod: 25

## 2024-05-22 PROCEDURE — 17110 DESTRUCTION B9 LES UP TO 14: CPT | Mod: LT,59

## 2024-05-22 RX ORDER — TERBINAFINE HYDROCHLORIDE 250 MG/1
250 TABLET ORAL DAILY
Qty: 7 | Refills: 0 | Status: ACTIVE | COMMUNITY
Start: 2024-04-24 | End: 1900-01-01

## 2024-05-22 NOTE — ASSESSMENT
[FreeTextEntry1] : Examination. Surgical debridement of the Verruca lesions of left foot, with pin point bleeding noted. AGNO3 of 1-14 lesions (Wn=[id number]) was applied. Then a dry, sterile dressing was applied. Patient was advised that removing a wart with this method requires debriding  the area, applying the AGNO3, and removing the dead skin. Instructed patient that it may take up to 12 weeks to remove a wart. Instructed patient to apply Verrustat to the wart lesions twice a day. Mycotic toe nails debrided with sterile nippers and electric marquita to patient's tolerance. Rx Lamisil pulse dose for 1 week, reviewed instructions for use with patient, Instructed patient to use medication, reviewed with patient use of medication. Instructed patient to dry toe nails well, and change damp or wet sock and shoes to dry ones. Patient demonstrated verbal understanding of all instructions. Patient to return to office in 2 weeks.

## 2024-05-22 NOTE — PHYSICAL EXAM
[General Appearance - Alert] : alert [General Appearance - In No Acute Distress] : in no acute distress [General Appearance - Well Nourished] : well nourished [de-identified] : No structural deformities bilateral. ROM WNL bilateral, with no tenderness and with ROM bilateral. [FreeTextEntry1] : 2 verruca lesions, 1st lesion measuring 0.3 x 0.3 cm, to left plantar submet 3, 2nd lesion measuring 0.3 cm x 0.3 cm slightly distal to the 1st lesion- both were palpated and examined. Upon palpation patient felt tenderness upon pinch test rather than direct palpation. The lesion(s) resemble a cauliflower like appearance, with petechiae noted to proximal lesion, pink healthy intact skin noted to be underlying the distal lesion. Mycotic, discolored, brittle toe nails present to toes 1 b/l and toes 5 b/l, with mild pain on palpation noted, with proximal clearing noted.

## 2024-05-22 NOTE — HISTORY OF PRESENT ILLNESS
[FreeTextEntry1] : This 39 year old female patient returns to office for continued care of warts on left foot. Patient states she has been applying the Verrucastat twice a day as instructed, and the wart area seems smaller to her. Patient states she notices improvement in the toe nails since starting Lamasil, states her fungal toe nails feel thick and painful lately.

## 2024-05-22 NOTE — REVIEW OF SYSTEMS
[Fever] : no fever [Chills] : no chills [Feeling Poorly] : not feeling poorly [Joint Swelling] : no joint swelling [Joint Stiffness] : no joint stiffness [Limb Pain] : no limb pain [Skin Wound] : no skin wound [Itching] : no itching [de-identified] : left foot warts, painful fungal toe nails

## 2024-06-05 ENCOUNTER — APPOINTMENT (OUTPATIENT)
Dept: PODIATRY | Facility: CLINIC | Age: 39
End: 2024-06-05
Payer: COMMERCIAL

## 2024-06-05 DIAGNOSIS — B35.3 TINEA PEDIS: ICD-10-CM

## 2024-06-05 PROCEDURE — 99213 OFFICE O/P EST LOW 20 MIN: CPT | Mod: 25

## 2024-06-05 PROCEDURE — 17110 DESTRUCTION B9 LES UP TO 14: CPT | Mod: LT

## 2024-06-05 RX ORDER — CLOTRIMAZOLE AND BETAMETHASONE DIPROPIONATE 10; .5 MG/G; MG/G
1-0.05 CREAM TOPICAL TWICE DAILY
Qty: 1 | Refills: 0 | Status: ACTIVE | COMMUNITY
Start: 2024-06-05 | End: 1900-01-01

## 2024-06-05 NOTE — HISTORY OF PRESENT ILLNESS
[FreeTextEntry1] : 39 year old female patient returns today for follow up care of warts on left foot. Patient states she has been applying the Verrucastat twice a day as instructed, and the wart area seems to be getting smaller. Patient states she took her pulse dose of Lamisil as instructed, and she notices improvement in the toe nails. Patient states s few days ago she noticed her right foot was itchy and when she started itching it she noticed the skin was flaking and had a few very tiny blisters. She states the blisters went away but the skin is still itchy and flaky.

## 2024-06-05 NOTE — ASSESSMENT
[FreeTextEntry1] : Exam. Surgical debridement of the Verruca lesion of left foot, with pin point bleeding noted. AGNO3 of 1-14 lesions (Ze=[id number]) was applied. Then a dry, sterile dressing was applied. Patient was advised that removing a wart with this method requires debriding  the area, applying the AGNO3, and removing the dead skin. Instructed patient that it may take up to 12 weeks to remove a wart. Instructed the patient to continue to apply Verrustat to the wart lesions twice a day. Discussed tinea pedis at [facility], providing education to patient. Rx Lotrisone, instructed patient on use of the medication. Instructed patient to dry toe nails and well, and change damp or wet sock and shoes to dry ones. Patient demonstrated verbal understanding of all instructions. Patient to return to office in 2 weeks.

## 2024-06-05 NOTE — REVIEW OF SYSTEMS
[Fever] : no fever [Chills] : no chills [Feeling Poorly] : not feeling poorly [Joint Swelling] : no joint swelling [Joint Stiffness] : no joint stiffness [Limb Pain] : no limb pain [Skin Wound] : no skin wound [Itching] : no itching [de-identified] : left foot warts, painful fungal toe nails, itching and flaking skin

## 2024-06-05 NOTE — PHYSICAL EXAM
[General Appearance - Alert] : alert [General Appearance - In No Acute Distress] : in no acute distress [General Appearance - Well Nourished] : well nourished [de-identified] : No structural deformities bilateral. ROM WNL bilateral. No tenderness and with ROM bilateral. [FreeTextEntry1] : Wart lesion measuring 0.3 x 0.3 cm, to left plantar submet 3, upon palpation patient felt tenderness upon pinch test rather than direct palpation. The lesion(s) resemble a cauliflower like appearance, with petechiae noted. Mycotic discolored, brittle toe nails present to toes 1 b/l and toes 5 b/l, with mild pain on palpation noted, with proximal clearing noted. Small, circular scaling to right foot to plantar forefoot and to toes 1-4, with no IDM, no open lesions, no blisters present. [Sensation] : the sensory exam was normal to light touch and pinprick

## 2024-06-19 ENCOUNTER — APPOINTMENT (OUTPATIENT)
Dept: PODIATRY | Facility: CLINIC | Age: 39
End: 2024-06-19
Payer: COMMERCIAL

## 2024-06-19 DIAGNOSIS — B07.9 VIRAL WART, UNSPECIFIED: ICD-10-CM

## 2024-06-19 DIAGNOSIS — B35.1 TINEA UNGUIUM: ICD-10-CM

## 2024-06-19 PROCEDURE — 17110 DESTRUCTION B9 LES UP TO 14: CPT | Mod: 59

## 2024-06-19 PROCEDURE — 99213 OFFICE O/P EST LOW 20 MIN: CPT | Mod: 25

## 2024-06-19 RX ORDER — TERBINAFINE HYDROCHLORIDE 250 MG/1
250 TABLET ORAL DAILY
Qty: 7 | Refills: 0 | Status: ACTIVE | COMMUNITY
Start: 2024-06-19 | End: 1900-01-01

## 2024-06-19 NOTE — PHYSICAL EXAM
[General Appearance - Alert] : alert [General Appearance - In No Acute Distress] : in no acute distress [General Appearance - Well Nourished] : well nourished [de-identified] : No structural deformities bilateral. ROM WNL bilateral. No tenderness on ROM bilateral. [FreeTextEntry1] : Wart lesion noted to be decreasing in size measuring 0.2 x 0.2 cm, to left plantar submet 3 with 3 very small satleite lesions just distal noted, upon palpation patient felt tenderness upon pinch test rather than direct palpation. The lesion(s) resemble a cauliflower like appearance, with petechiae noted. Mycotic discolored, brittle toe nails present to toes 1 b/l and toes 5 b/l, with mild pain on palpation noted, with increased proximal clearing noted. Small, circular scaling to right foot to plantar forefoot and to toes 1-4, with no IDM, no blisters present. No open lesions b/l. [Sensation] : the sensory exam was normal to light touch and pinprick

## 2024-06-19 NOTE — ASSESSMENT
[FreeTextEntry1] : Examination. Surgical debridement of the Verruca lesion(s) of left foot, with pin point bleeding noted. AGNO3 of 1-14 lesions ([id number]) was applied. Then a dry, sterile dressing was applied. Patient was advised that removing a wart with this method requires debriding  the area, applying the AGNO3, and removing the dead skin. Instructed the patient to continue to apply Verrustat to the wart lesions twice a day. Instructed the patient to continue applying Lotrisone to the right foot. Instructed patient to dry feet and toe nails and well, and change damp or wet sock and shoes to dry ones. Mycotic toe nails debrided with sterile nippers and electric marquita to patient's tolerance. Rx Lamasil, reviewed with patient instructions for use of medication. Instructed patient to dry toe nails well, and change damp or wet sock and shoes to dry ones. Patient demonstrated verbal understanding of all instructions. Patient to return to office in 2 weeks.

## 2024-06-19 NOTE — HISTORY OF PRESENT ILLNESS
[FreeTextEntry1] : This 39 year old female patient returns to office for continued care of warts on left foot. She states she's been applying the Verrucastat twice a day as instructed. Patient states she notices improvement in her toe nails since starting Lamisil. Patient states she has been applying lotrisone to the right foot as instructed and the skin is no longer itchy and there is still flaking but it's less.

## 2024-06-19 NOTE — REVIEW OF SYSTEMS
[Fever] : no fever [Chills] : no chills [Feeling Poorly] : not feeling poorly [Joint Swelling] : no joint swelling [Joint Stiffness] : no joint stiffness [Limb Pain] : no limb pain [Skin Wound] : no skin wound [Itching] : no itching [de-identified] : left foot warts, painful fungal toe nails, flaking athlete's foot

## 2024-07-03 ENCOUNTER — APPOINTMENT (OUTPATIENT)
Dept: PODIATRY | Facility: CLINIC | Age: 39
End: 2024-07-03
Payer: COMMERCIAL

## 2024-07-03 DIAGNOSIS — B35.3 TINEA PEDIS: ICD-10-CM

## 2024-07-03 PROCEDURE — 17110 DESTRUCTION B9 LES UP TO 14: CPT | Mod: 59

## 2024-07-22 ENCOUNTER — APPOINTMENT (OUTPATIENT)
Dept: PODIATRY | Facility: CLINIC | Age: 39
End: 2024-07-22
Payer: COMMERCIAL

## 2024-07-22 DIAGNOSIS — B35.1 TINEA UNGUIUM: ICD-10-CM

## 2024-07-22 DIAGNOSIS — B07.9 VIRAL WART, UNSPECIFIED: ICD-10-CM

## 2024-07-22 PROCEDURE — 99213 OFFICE O/P EST LOW 20 MIN: CPT | Mod: 25

## 2024-07-22 PROCEDURE — 17110 DESTRUCTION B9 LES UP TO 14: CPT | Mod: 59

## 2024-07-22 NOTE — HISTORY OF PRESENT ILLNESS
[FreeTextEntry1] : This 39 year old female patient returns for continued care of warts on left foot. She states she's been applying the EBM wart medicine as instructed. Patient states she continues to see improvement in the toe nails since starting the Lamisil. Patient states she has been using the lotrisone as instructed for the right foot and it feels and looks better.

## 2024-07-22 NOTE — PHYSICAL EXAM
[General Appearance - Alert] : alert [General Appearance - In No Acute Distress] : in no acute distress [General Appearance - Well Nourished] : well nourished [Sensation] : the sensory exam was normal to light touch and pinprick [de-identified] : No structural deformities bilateral. ROM WNL bilateral. No tenderness on ROM bilateral. [FreeTextEntry1] : Wart lesion noted, 0.1 x 0.1 cm, to left plantar submet 3, upon palpation patient felt tenderness upon pinch test rather than direct palpation. The lesion(s) resemble a cauliflower like appearance, with petechiae noted. area of previous 3 very small satellite lesions just distal noted to verruca, now resolved with healthy, intact skin noted to the area. Mycotic discolored, brittle toe nails present to toes 1 b/l and toes 5 b/l, with mild pain on palpation noted, proximal clearing noted. Annular scaling resolved to right foot. No open lesions b/l.

## 2024-07-22 NOTE — REVIEW OF SYSTEMS
[Fever] : no fever [Chills] : no chills [Feeling Poorly] : not feeling poorly [Joint Swelling] : no joint swelling [Joint Stiffness] : no joint stiffness [Limb Pain] : no limb pain [Skin Wound] : no skin wound [Itching] : no itching [de-identified] : left foot warts, painful fungal toe nails, athlete's foot

## 2024-07-22 NOTE — ASSESSMENT
[FreeTextEntry1] : Examination. Surgical debridement of the Verruca lesion(s) of left foot, with pin point bleeding noted. AGNO3 of 1-14 lesions was applied. A dry, sterile dressing was applied. Patient was advised that removing a wart with this method requires debriding the area, applying the AGNO3, and removing the dead skin. Instructed the patient to continue to apply EBM wart medication to the wart lesion twice a day. Instructed the patient to stop applying Lotrisone to the right foot. Instructed patient to dry feet and toe nails and well and change damp or wet sock and shoes to dry ones. Mycotic toe nails debrided with sterile nippers and electric marquita to patient's tolerance. Rx pulse dose Lamasil. Instructed patient to use medication, reviewed with patient use of medication. Patient demonstrated verbal understanding of all instructions. Patient to return to office in 2 weeks.

## 2024-08-07 ENCOUNTER — APPOINTMENT (OUTPATIENT)
Dept: PODIATRY | Facility: CLINIC | Age: 39
End: 2024-08-07

## 2024-08-07 PROCEDURE — 99213 OFFICE O/P EST LOW 20 MIN: CPT

## 2024-08-07 NOTE — ASSESSMENT
[FreeTextEntry1] : Exam. Surgical debridement of the Verruca lesion(s) of left foot, with intact, healthy skin, with no petechia noted. Instructed the patient to stop applying EBM wart medication to the wart lesion. Instructed patient to continue to dry feet and toe nails and well and change damp or wet sock and shoes to dry ones. Patient demonstrated verbal understanding of all instructions. Patient to return to office in 2 weeks.

## 2024-08-07 NOTE — HISTORY OF PRESENT ILLNESS
[FreeTextEntry1] : 39 year old female patient returns today for follow up care of warts on left foot. She states she's been applying the EB wart medicine as instructed. She states she thinks because she has been swimming a lot lately, the hard, thick skin at the wart site came off in chunks a few times, she states the skin area can be sensitive but the pain is much less. She states she took her Lamasil as instructed, and continues to see improvement in the toe nails.

## 2024-08-07 NOTE — PHYSICAL EXAM
[General Appearance - In No Acute Distress] : in no acute distress [General Appearance - Alert] : alert [General Appearance - Well Nourished] : well nourished [Sensation] : the sensory exam was normal to light touch and pinprick [de-identified] : No structural deformities bilateral. ROM WNL bilateral. No tenderness on ROM of bilateral feet. [FreeTextEntry1] : Wart lesion noted, 0.1 x 0.1 cm, to left plantar submet 3, upon palpation patient felt no tenderness upon pinch test rather than direct palpation. The lesion(s) resemble a cauliflower like appearance, upon debridement no petechiae, intact healthy skin noted- skin appearing newly epithelialized, consistent with skin flaking off sq reported by patient. Mycotic discolored, brittle toe nails present to toes 1 b/l and toes 5 b/l, mild pain on palpation noted, proximal clearing noted. Annular scaling resolved to right foot. No open lesions b/l, no clinical signs of bacterial infection b/l..

## 2024-08-07 NOTE — REVIEW OF SYSTEMS
[Fever] : no fever [Chills] : no chills [Feeling Poorly] : not feeling poorly [Joint Swelling] : no joint swelling [Joint Stiffness] : no joint stiffness [Limb Pain] : no limb pain [Skin Wound] : no skin wound [Itching] : no itching [de-identified] : left foot warts, painful fungal toe nails

## 2024-08-21 ENCOUNTER — APPOINTMENT (OUTPATIENT)
Dept: PODIATRY | Facility: CLINIC | Age: 39
End: 2024-08-21
Payer: COMMERCIAL

## 2024-08-21 DIAGNOSIS — B35.1 TINEA UNGUIUM: ICD-10-CM

## 2024-08-21 PROCEDURE — 99213 OFFICE O/P EST LOW 20 MIN: CPT

## 2024-08-21 NOTE — HISTORY OF PRESENT ILLNESS
[FreeTextEntry1] : This 39 year old female patient returns for continued care of fungal toe nails states she took her Lamasil as instructed, and continues to see improvement in the toe nails.

## 2024-08-21 NOTE — REVIEW OF SYSTEMS
[Fever] : no fever [Chills] : no chills [Feeling Poorly] : not feeling poorly [Joint Swelling] : no joint swelling [Joint Stiffness] : no joint stiffness [Limb Pain] : no limb pain [Skin Wound] : no skin wound [Itching] : no itching [de-identified] : painful fungal toe nails

## 2024-08-21 NOTE — ASSESSMENT
[FreeTextEntry1] : Examination. Mycotic toe nails debrided with sterile nippers and electric marquita to patient's tolerance. Rx Lamasil pulse dose. Reviewed with patient the instructions for use of the medication. Instructed patient to dry toe nails well, and change damp or wet sock and shoes to dry ones. Patient demonstrated verbal understanding of all instructions. Patient demonstrated verbal understanding of all instructions. Patient to return to office in 1 month.

## 2024-08-21 NOTE — PHYSICAL EXAM
[General Appearance - Alert] : alert [General Appearance - In No Acute Distress] : in no acute distress [General Appearance - Well Nourished] : well nourished [Sensation] : the sensory exam was normal to light touch and pinprick [de-identified] : No structural deformities bilateral. ROM WNL bilateral. No tenderness on ROM of bilateral feet. [FreeTextEntry1] : Mycotic discolored, brittle toe nails present to toes 1 b/l and toes 5 b/l, mild pain on palpation noted, with proximal clearing noted. No open lesions b/l, with no clinical signs of bacterial infection b/l.

## 2024-09-19 ENCOUNTER — APPOINTMENT (OUTPATIENT)
Dept: PODIATRY | Facility: CLINIC | Age: 39
End: 2024-09-19
Payer: COMMERCIAL

## 2024-09-19 DIAGNOSIS — B35.1 TINEA UNGUIUM: ICD-10-CM

## 2024-09-19 PROCEDURE — 99213 OFFICE O/P EST LOW 20 MIN: CPT

## 2024-09-19 NOTE — HISTORY OF PRESENT ILLNESS
[FreeTextEntry1] : 39 year old female patient returns to office for follow up care of fungal toe nails. Patient states she took her Lamasil pulse dose as instructed and she notices improvement in the toe nails.

## 2024-09-19 NOTE — REVIEW OF SYSTEMS
[Fever] : no fever [Chills] : no chills [Feeling Poorly] : not feeling poorly [Joint Swelling] : no joint swelling [Joint Stiffness] : no joint stiffness [Limb Pain] : no limb pain [Skin Wound] : no skin wound [Itching] : no itching [de-identified] : painful fungal toe nails

## 2024-09-19 NOTE — PHYSICAL EXAM
[General Appearance - Alert] : alert [General Appearance - In No Acute Distress] : in no acute distress [General Appearance - Well Nourished] : well nourished [Sensation] : the sensory exam was normal to light touch and pinprick [de-identified] : No structural deformities bilateral. ROM WNL bilateral, with no tenderness on ROM of bilateral feet. [FreeTextEntry1] : Mycotic discolored, brittle toe nails present to toes 1 b/l and toes 5 b/l, mild pain on palpation noted, with clinical improvement, proximal clearing noted. No open lesions to b/l feet. No no clinical signs of bacterial infection b/l.

## 2024-09-19 NOTE — ASSESSMENT
[FreeTextEntry1] : Exam. Mycotic toe nails each debrided with sterile nippers and electric marquita to patient's tolerance. Rx Lamasil pulse dose, reviewed with the patient the instructions for use of the medication. Instructed the patient to dry toe nails well, and change damp or wet sock and shoes to dry ones. Patient demonstrated verbal understanding of all instructions. Patient demonstrated verbal understanding of all instructions. Patient to return to our office in 1 month.

## 2024-10-17 ENCOUNTER — APPOINTMENT (OUTPATIENT)
Dept: PODIATRY | Facility: CLINIC | Age: 39
End: 2024-10-17
Payer: COMMERCIAL

## 2024-10-17 DIAGNOSIS — B35.3 TINEA PEDIS: ICD-10-CM

## 2024-10-17 PROCEDURE — 99213 OFFICE O/P EST LOW 20 MIN: CPT

## 2024-10-17 RX ORDER — CLOTRIMAZOLE AND BETAMETHASONE DIPROPIONATE 10; .5 MG/G; MG/G
1-0.05 CREAM TOPICAL TWICE DAILY
Qty: 1 | Refills: 0 | Status: ACTIVE | COMMUNITY
Start: 2024-10-17 | End: 1900-01-01

## 2024-11-18 ENCOUNTER — APPOINTMENT (OUTPATIENT)
Dept: PODIATRY | Facility: CLINIC | Age: 39
End: 2024-11-18
Payer: COMMERCIAL

## 2024-11-18 DIAGNOSIS — B35.1 TINEA UNGUIUM: ICD-10-CM

## 2024-11-18 PROCEDURE — 99213 OFFICE O/P EST LOW 20 MIN: CPT

## 2025-01-03 ENCOUNTER — APPOINTMENT (OUTPATIENT)
Dept: PODIATRY | Facility: CLINIC | Age: 40
End: 2025-01-03
Payer: COMMERCIAL

## 2025-01-03 ENCOUNTER — NON-APPOINTMENT (OUTPATIENT)
Age: 40
End: 2025-01-03

## 2025-01-03 DIAGNOSIS — B35.3 TINEA PEDIS: ICD-10-CM

## 2025-01-03 DIAGNOSIS — B35.1 TINEA UNGUIUM: ICD-10-CM

## 2025-01-03 PROCEDURE — 99213 OFFICE O/P EST LOW 20 MIN: CPT

## 2025-02-05 ENCOUNTER — APPOINTMENT (OUTPATIENT)
Dept: PODIATRY | Facility: CLINIC | Age: 40
End: 2025-02-05
Payer: COMMERCIAL

## 2025-02-05 DIAGNOSIS — B35.1 TINEA UNGUIUM: ICD-10-CM

## 2025-02-05 PROCEDURE — 99213 OFFICE O/P EST LOW 20 MIN: CPT

## 2025-03-05 ENCOUNTER — APPOINTMENT (OUTPATIENT)
Dept: PODIATRY | Facility: CLINIC | Age: 40
End: 2025-03-05
Payer: COMMERCIAL

## 2025-03-05 DIAGNOSIS — B35.1 TINEA UNGUIUM: ICD-10-CM

## 2025-03-05 PROCEDURE — 99213 OFFICE O/P EST LOW 20 MIN: CPT

## 2025-04-24 ENCOUNTER — APPOINTMENT (OUTPATIENT)
Dept: PODIATRY | Facility: CLINIC | Age: 40
End: 2025-04-24
Payer: COMMERCIAL

## 2025-04-24 DIAGNOSIS — B35.1 TINEA UNGUIUM: ICD-10-CM

## 2025-04-24 PROCEDURE — 99213 OFFICE O/P EST LOW 20 MIN: CPT

## 2025-05-21 ENCOUNTER — APPOINTMENT (OUTPATIENT)
Dept: PODIATRY | Facility: CLINIC | Age: 40
End: 2025-05-21
Payer: COMMERCIAL

## 2025-05-21 DIAGNOSIS — B35.1 TINEA UNGUIUM: ICD-10-CM

## 2025-05-21 PROCEDURE — 99213 OFFICE O/P EST LOW 20 MIN: CPT

## 2025-06-23 ENCOUNTER — APPOINTMENT (OUTPATIENT)
Dept: PODIATRY | Facility: CLINIC | Age: 40
End: 2025-06-23
Payer: COMMERCIAL

## 2025-06-23 PROCEDURE — 99213 OFFICE O/P EST LOW 20 MIN: CPT

## 2025-07-23 ENCOUNTER — APPOINTMENT (OUTPATIENT)
Dept: PODIATRY | Facility: CLINIC | Age: 40
End: 2025-07-23
Payer: COMMERCIAL

## 2025-07-23 DIAGNOSIS — B35.1 TINEA UNGUIUM: ICD-10-CM

## 2025-07-23 PROCEDURE — 99213 OFFICE O/P EST LOW 20 MIN: CPT

## 2025-09-10 ENCOUNTER — APPOINTMENT (OUTPATIENT)
Dept: PODIATRY | Facility: CLINIC | Age: 40
End: 2025-09-10
Payer: COMMERCIAL

## 2025-09-10 DIAGNOSIS — B35.1 TINEA UNGUIUM: ICD-10-CM

## 2025-09-10 PROCEDURE — 99213 OFFICE O/P EST LOW 20 MIN: CPT
